# Patient Record
Sex: MALE | Race: WHITE | NOT HISPANIC OR LATINO | Employment: FULL TIME | URBAN - METROPOLITAN AREA
[De-identification: names, ages, dates, MRNs, and addresses within clinical notes are randomized per-mention and may not be internally consistent; named-entity substitution may affect disease eponyms.]

---

## 2017-06-10 ENCOUNTER — HOSPITAL ENCOUNTER (EMERGENCY)
Facility: OTHER | Age: 37
Discharge: HOME | End: 2017-06-10
Attending: EMERGENCY MEDICINE
Payer: COMMERCIAL

## 2017-06-10 VITALS
DIASTOLIC BLOOD PRESSURE: 78 MMHG | RESPIRATION RATE: 16 BRPM | HEIGHT: 71 IN | OXYGEN SATURATION: 99 % | SYSTOLIC BLOOD PRESSURE: 138 MMHG | HEART RATE: 87 BPM | BODY MASS INDEX: 23.1 KG/M2 | WEIGHT: 165 LBS | TEMPERATURE: 98 F

## 2017-06-10 DIAGNOSIS — S69.91XA INJURY OF NAIL BED OF FINGER OF RIGHT HAND, INITIAL ENCOUNTER: Primary | ICD-10-CM

## 2017-06-10 PROCEDURE — 99283 EMERGENCY DEPT VISIT LOW MDM: CPT

## 2017-06-10 PROCEDURE — 25000003 PHARM REV CODE 250: Performed by: EMERGENCY MEDICINE

## 2017-06-10 RX ADMIN — BACITRACIN, NEOMYCIN, POLYMYXIN B 1 EACH: 400; 3.5; 5 OINTMENT TOPICAL at 03:06

## 2017-06-10 NOTE — ED PROVIDER NOTES
"Encounter Date: 6/10/2017    SCRIBE #1 NOTE: I, Ryan Nan, am scribing for, and in the presence of, Dr. Ellis.       History     Chief Complaint   Patient presents with    Finger Pain     Patient was washing the dishes and was stuck under the nail of his right hand index finger with "a piece of pasta". Patient c/o pain to the finger and stated, "My family is freaking me out about it possibly being infected."     Review of patient's allergies indicates:   Allergen Reactions    Vancomycin analogues      Time seen by provider: 3:12 PM    This is a 36 y.o. male who presents to the ED with a chief complaint of mild pain to his right index finger. He states that he was scrubbing a pot about 5 hours ago when a piece of dried cooked moody hair pasta became stuck under his fingernail. He states that he does not believes there is any other foreign body under the nail. He reports he came in to the ED due to the urging of his family to ensure there is no infection in the finger. No significant pain, no bleeding, no other complaints          History reviewed. No pertinent past medical history.  Past Surgical History:   Procedure Laterality Date    FRACTURE SURGERY      nose     History reviewed. No pertinent family history.  Social History   Substance Use Topics    Smoking status: Current Every Day Smoker     Packs/day: 1.00     Types: Cigarettes    Smokeless tobacco: Not on file    Alcohol use 4.2 oz/week     7 Cans of beer per week     Review of Systems   Constitutional: Negative for fever.   HENT: Negative for sore throat.    Respiratory: Negative for shortness of breath.    Cardiovascular: Negative for chest pain.   Gastrointestinal: Negative for nausea.   Genitourinary: Negative for dysuria.   Musculoskeletal: Negative for back pain.        Positive for right index finger pain.   Skin: Negative for rash.   Neurological: Negative for weakness.   Hematological: Does not bruise/bleed easily.       Physical Exam "     Initial Vitals [06/10/17 1434]   BP Pulse Resp Temp SpO2   (!) 144/81 85 14 98.1 °F (36.7 °C) (!) 1 %     Physical Exam    Nursing note and vitals reviewed.  Constitutional: He appears well-developed and well-nourished. He is not diaphoretic. No distress.   HENT:   Head: Normocephalic and atraumatic.   Right Ear: External ear normal.   Left Ear: External ear normal.   Eyes: EOM are normal. Right eye exhibits no discharge. Left eye exhibits no discharge.   Neck: Normal range of motion.   Cardiovascular: Normal rate, regular rhythm and normal heart sounds. Exam reveals no gallop and no friction rub.    No murmur heard.  Pulmonary/Chest: Breath sounds normal. No respiratory distress. He has no wheezes. He has no rhonchi. He has no rales.   Abdominal: Soft. There is no tenderness. There is no rebound and no guarding.   Musculoskeletal: Normal range of motion. He exhibits no edema or tenderness.   Right index finger: Less than 1 cm area of blanching under the distal mid-fingernail. No subungual hematoma or obvious foreign body. Minimal tenderness. No erythema.   Neurological: He is alert and oriented to person, place, and time.   Skin: Skin is warm and dry. No rash and no abscess noted. No erythema. No pallor.   Psychiatric: He has a normal mood and affect. His behavior is normal. Judgment and thought content normal.         ED Course   Procedures  Labs Reviewed - No data to display          Medical Decision Making:   Initial Assessment:       Healthy 36M presents with R 2nd finger evaluation; he had cooked dry pasta get under nail, no bleeding or other known FB. Minimal pain and no sign infection on exam, which is unlikely since pt with no known break in skin and occurred only 5 hours ago. Slight blanching to fingernail where pasta is likely located; area irrigated to soften pasta and facilitate natural migration and evacuation of FB. No indication for nail removal to remove organic non-embedded FB, no indication  for imaging or empiric Abx. Discharged with conservative and expectant management, return precautions for any sign of unlikely infection                Scribe Attestation:   Scribe #1: I performed the above scribed service and the documentation accurately describes the services I performed. I attest to the accuracy of the note.    Attending Attestation:           Physician Attestation for Scribe:  Physician Attestation Statement for Scribe #1: I, Dr. Ellis, reviewed documentation, as scribed by Ryan Montana in my presence, and it is both accurate and complete.                 ED Course     Clinical Impression:     1. Injury of nail bed of finger of right hand, initial encounter                Eugenio Ellis MD  06/10/17 5515

## 2017-06-10 NOTE — ED TRIAGE NOTES
"Pt states, "I got an moody hair pasta stuck under my nail this morning."  Pain 1/10.  Yellow discoloration noted to distal nail bed of R forefinger.  "

## 2017-10-22 ENCOUNTER — HOSPITAL ENCOUNTER (EMERGENCY)
Facility: OTHER | Age: 37
Discharge: HOME | End: 2017-10-22
Attending: EMERGENCY MEDICINE
Payer: MEDICAID

## 2017-10-22 VITALS
OXYGEN SATURATION: 99 % | HEIGHT: 71 IN | HEART RATE: 64 BPM | TEMPERATURE: 98 F | WEIGHT: 170 LBS | SYSTOLIC BLOOD PRESSURE: 129 MMHG | RESPIRATION RATE: 18 BRPM | BODY MASS INDEX: 23.8 KG/M2 | DIASTOLIC BLOOD PRESSURE: 83 MMHG

## 2017-10-22 DIAGNOSIS — R00.2 PALPITATIONS: ICD-10-CM

## 2017-10-22 DIAGNOSIS — R07.9 CHEST PAIN OF UNCERTAIN ETIOLOGY: Primary | ICD-10-CM

## 2017-10-22 DIAGNOSIS — R07.9 CHEST PAIN: ICD-10-CM

## 2017-10-22 LAB
ALBUMIN SERPL BCP-MCNC: 4.5 G/DL
ALP SERPL-CCNC: 73 U/L
ALT SERPL W/O P-5'-P-CCNC: 41 U/L
AMPHET+METHAMPHET UR QL: NEGATIVE
ANION GAP SERPL CALC-SCNC: 10 MMOL/L
AST SERPL-CCNC: 25 U/L
BARBITURATES UR QL SCN>200 NG/ML: NEGATIVE
BASOPHILS # BLD AUTO: 0.03 K/UL
BASOPHILS NFR BLD: 0.3 %
BENZODIAZ UR QL SCN>200 NG/ML: NEGATIVE
BILIRUB SERPL-MCNC: 0.5 MG/DL
BILIRUB UR QL STRIP: NEGATIVE
BUN SERPL-MCNC: 14 MG/DL
BZE UR QL SCN: NEGATIVE
CALCIUM SERPL-MCNC: 9.9 MG/DL
CANNABINOIDS UR QL SCN: NEGATIVE
CHLORIDE SERPL-SCNC: 104 MMOL/L
CLARITY UR: CLEAR
CO2 SERPL-SCNC: 27 MMOL/L
COLOR UR: YELLOW
CREAT SERPL-MCNC: 0.9 MG/DL
CREAT UR-MCNC: 66.2 MG/DL
D DIMER PPP IA.FEU-MCNC: 0.31 MG/L FEU
DIFFERENTIAL METHOD: ABNORMAL
EOSINOPHIL # BLD AUTO: 0.2 K/UL
EOSINOPHIL NFR BLD: 2.7 %
ERYTHROCYTE [DISTWIDTH] IN BLOOD BY AUTOMATED COUNT: 13.4 %
EST. GFR  (AFRICAN AMERICAN): >60 ML/MIN/1.73 M^2
EST. GFR  (NON AFRICAN AMERICAN): >60 ML/MIN/1.73 M^2
GLUCOSE SERPL-MCNC: 94 MG/DL
GLUCOSE UR QL STRIP: NEGATIVE
HCT VFR BLD AUTO: 43.5 %
HGB BLD-MCNC: 15 G/DL
HGB UR QL STRIP: NEGATIVE
KETONES UR QL STRIP: NEGATIVE
LEUKOCYTE ESTERASE UR QL STRIP: NEGATIVE
LYMPHOCYTES # BLD AUTO: 2.5 K/UL
LYMPHOCYTES NFR BLD: 28.8 %
MAGNESIUM SERPL-MCNC: 2.2 MG/DL
MCH RBC QN AUTO: 31.7 PG
MCHC RBC AUTO-ENTMCNC: 34.5 G/DL
MCV RBC AUTO: 92 FL
METHADONE UR QL SCN>300 NG/ML: NEGATIVE
MONOCYTES # BLD AUTO: 0.6 K/UL
MONOCYTES NFR BLD: 7 %
NEUTROPHILS # BLD AUTO: 5.3 K/UL
NEUTROPHILS NFR BLD: 60.7 %
NITRITE UR QL STRIP: NEGATIVE
OPIATES UR QL SCN: NEGATIVE
PCP UR QL SCN>25 NG/ML: NEGATIVE
PH UR STRIP: 6 [PH] (ref 5–8)
PHOSPHATE SERPL-MCNC: 3.2 MG/DL
PLATELET # BLD AUTO: 236 K/UL
PMV BLD AUTO: 10.2 FL
POTASSIUM SERPL-SCNC: 4 MMOL/L
PROT SERPL-MCNC: 7.7 G/DL
PROT UR QL STRIP: NEGATIVE
RBC # BLD AUTO: 4.73 M/UL
SODIUM SERPL-SCNC: 141 MMOL/L
SP GR UR STRIP: 1.01 (ref 1–1.03)
TOXICOLOGY INFORMATION: NORMAL
TROPONIN I SERPL DL<=0.01 NG/ML-MCNC: <0.006 NG/ML
URN SPEC COLLECT METH UR: NORMAL
UROBILINOGEN UR STRIP-ACNC: NEGATIVE EU/DL
WBC # BLD AUTO: 8.66 K/UL

## 2017-10-22 PROCEDURE — 99284 EMERGENCY DEPT VISIT MOD MDM: CPT | Mod: 25

## 2017-10-22 PROCEDURE — 85025 COMPLETE CBC W/AUTO DIFF WBC: CPT

## 2017-10-22 PROCEDURE — 63600175 PHARM REV CODE 636 W HCPCS: Performed by: EMERGENCY MEDICINE

## 2017-10-22 PROCEDURE — 80307 DRUG TEST PRSMV CHEM ANLYZR: CPT

## 2017-10-22 PROCEDURE — 80053 COMPREHEN METABOLIC PANEL: CPT

## 2017-10-22 PROCEDURE — 96374 THER/PROPH/DIAG INJ IV PUSH: CPT

## 2017-10-22 PROCEDURE — 81003 URINALYSIS AUTO W/O SCOPE: CPT

## 2017-10-22 PROCEDURE — 84100 ASSAY OF PHOSPHORUS: CPT

## 2017-10-22 PROCEDURE — 96361 HYDRATE IV INFUSION ADD-ON: CPT

## 2017-10-22 PROCEDURE — 25000003 PHARM REV CODE 250: Performed by: EMERGENCY MEDICINE

## 2017-10-22 PROCEDURE — 93005 ELECTROCARDIOGRAM TRACING: CPT

## 2017-10-22 PROCEDURE — 93010 ELECTROCARDIOGRAM REPORT: CPT | Mod: ,,, | Performed by: INTERNAL MEDICINE

## 2017-10-22 PROCEDURE — 85379 FIBRIN DEGRADATION QUANT: CPT

## 2017-10-22 PROCEDURE — 83735 ASSAY OF MAGNESIUM: CPT

## 2017-10-22 PROCEDURE — 84484 ASSAY OF TROPONIN QUANT: CPT

## 2017-10-22 RX ORDER — KETOROLAC TROMETHAMINE 30 MG/ML
15 INJECTION, SOLUTION INTRAMUSCULAR; INTRAVENOUS
Status: COMPLETED | OUTPATIENT
Start: 2017-10-22 | End: 2017-10-22

## 2017-10-22 RX ADMIN — SODIUM CHLORIDE 1000 ML: 0.9 INJECTION, SOLUTION INTRAVENOUS at 02:10

## 2017-10-22 RX ADMIN — KETOROLAC TROMETHAMINE 15 MG: 30 INJECTION, SOLUTION INTRAMUSCULAR at 02:10

## 2017-10-22 NOTE — ED TRIAGE NOTES
"C/o left-sided chest pain described as "contraction" onset 30 minutes ago with palpitations. Pt was at rest at time of onset. Denies sob, n/v. Denies excessive caffeine intake. Reports pain decreased but still present. States "It feels like a muscle spasm."  "

## 2017-10-22 NOTE — ED NOTES
Patient sitting upright in recliner.  Eyes open, no apparent SOB or distress noted.  AAOx 4.  Family members at bedside.  Patient pleasant and cooperative with poc.  Patient updated with poc.  Call bell within reach, will continue to monitor.

## 2017-10-22 NOTE — ED PROVIDER NOTES
"Encounter Date: 10/22/2017    SCRIBE #1 NOTE: I, Vianca Murray, am scribing for, and in the presence of,  Dr. Burk. I have scribed the entire note.       History     Chief Complaint   Patient presents with    Chest Pain     Pt c/o left sided CP with mild SOB whic startede 30 minutes prior to arrival.     Time seen by provider: 2:01 PM    This is a 36 y.o. male who presents with complaint of palpitations which started about 30 minutes PTA. Pt was laying on the couch watching a game on TV when he had onset of palpitations. The palpitations "come with squeezing pain/tightness" to the L chest. It is unchanged with deep breathing or movement. He also c/o accompanying mild lightheadedness and tingling to the extremities. He states that he feels anxious at the moment. Pt denies any excessive drinking or any drug use. He did not eat breakfast but states that he typically does not have breakfast in the mornings. The only other thing he had done was go to a work meeting prior to watching the game. He denies any fever, chills, sore throat, rhinorrhea, SOB, cough, N/V/D, abdominal pain, leg swelling, HA, and weakness. He has no major medical problems and does not take any daily medications. Pt is not taking any unofficial or foreign supplementary medications. He smokes cigarettes and has about 12 drinks/week. Pt is allergic to vancomycin.      The history is provided by the patient.     Review of patient's allergies indicates:   Allergen Reactions    Vancomycin analogues      History reviewed. No pertinent past medical history.  Past Surgical History:   Procedure Laterality Date    FRACTURE SURGERY      nose     No family history on file.  Social History   Substance Use Topics    Smoking status: Current Every Day Smoker     Packs/day: 1.00     Types: Cigarettes    Smokeless tobacco: Never Used    Alcohol use 2.4 - 3.0 oz/week     4 - 5 Cans of beer per week      Comment: twice weekly     Review of Systems "   Constitutional: Negative for chills and fever.   HENT: Negative for rhinorrhea and sore throat.    Eyes: Negative for visual disturbance.   Respiratory: Positive for chest tightness (L). Negative for cough and shortness of breath.    Cardiovascular: Positive for chest pain (L) and palpitations. Negative for leg swelling.   Gastrointestinal: Negative for abdominal pain, diarrhea, nausea and vomiting.   Genitourinary: Negative for dysuria and frequency.   Musculoskeletal: Negative for joint swelling, neck pain and neck stiffness.   Skin: Negative for rash.   Neurological: Positive for light-headedness (mild). Negative for weakness, numbness and headaches.        Tingling to all four extremities.   Psychiatric/Behavioral: Negative for confusion. The patient is nervous/anxious.        Physical Exam     Initial Vitals [10/22/17 1329]   BP Pulse Resp Temp SpO2   137/77 108 18 98.2 °F (36.8 °C) 98 %      MAP       97         Vitals:    10/22/17 1512 10/22/17 1539 10/22/17 1608 10/22/17 1638   BP:  125/77 127/74 124/80   Pulse: 64 70 64 70   Resp:       Temp:       TempSrc:       SpO2: 99% 99% 98% 99%   Weight:       Height:        10/22/17 1648   BP: 129/83   Pulse: 64   Resp:    Temp:    TempSrc:    SpO2: 99%   Weight:    Height:        Physical Exam    Nursing note and vitals reviewed.  Constitutional: He appears well-developed and well-nourished. No distress.   HENT:   Head: Normocephalic and atraumatic.   Mouth/Throat: Oropharynx is clear and moist.   Eyes: Conjunctivae and EOM are normal. Pupils are equal, round, and reactive to light.   Neck: Normal range of motion. Neck supple.   Cardiovascular: Normal rate, regular rhythm and normal heart sounds.   No murmur heard.  Pulses:       Radial pulses are 2+ on the right side, and 2+ on the left side.   Pulmonary/Chest: Breath sounds normal. No respiratory distress. He has no wheezes. He has no rhonchi. He has no rales. He exhibits tenderness.   TTP to L anterior chest  "wall.   Abdominal: Soft. Bowel sounds are normal. There is no tenderness.   Musculoskeletal: Normal range of motion.   No calf tenderness or edema.   Neurological: He is alert and oriented to person, place, and time. He has normal strength. No cranial nerve deficit or sensory deficit.   Skin: Skin is warm and dry. No rash noted.   Psychiatric: He has a normal mood and affect. His behavior is normal.         ED Course   Procedures  Labs Reviewed   CBC W/ AUTO DIFFERENTIAL - Abnormal; Notable for the following:        Result Value    MCH 31.7 (*)     All other components within normal limits   COMPREHENSIVE METABOLIC PANEL   URINALYSIS   DRUG SCREEN PANEL, URINE EMERGENCY   TROPONIN I   D DIMER, QUANTITATIVE   MAGNESIUM   PHOSPHORUS     EKG Readings: (Independently Interpreted)   ST at a rate of 111. No STEMI. S1Q3T3 pattern.        X-Rays:   Independently Interpreted Readings:   Chest X-Ray: No infiltrate, effusion, or PTX. No acute process. Will refer to official radiology reading.     Imaging Results          X-Ray Chest PA And Lateral (Final result)  Result time 10/22/17 14:33:45    Final result by Valencia Kothari MD (10/22/17 14:33:45)                 Impression:     No detrimental change since 8/22/10.      Electronically signed by: VALENCIA KOTHARI MD  Date:     10/22/17  Time:    14:33              Narrative:    XR CHEST PA AND LATERAL.  Clinical History provided for exam:"Chest Pain". The cardiac silhouette is not enlarged.  Pulmonary vascularity is not increased.  The lungs are free of lobar consolidation and alveolar edema.  There is no pleural effusion or pneumothorax.  Visualized osseous structures are intact.                              Medical Decision Making:   Independently Interpreted Test(s):   I have ordered and independently interpreted X-rays - see prior notes.  I have ordered and independently interpreted EKG Reading(s) - see prior notes  Clinical Tests:   Lab Tests: Ordered and " Reviewed  Radiological Study: Ordered and Reviewed  Medical Tests: Ordered and Reviewed  ED Management:  Emergent evaluation of 36-year-old male with complaint of sudden onset palpitations and chest pain while at rest.  Vital signs reveal mild tachycardia, afebrile.  Physical exam revealed reproducible tenderness over the left chest wall.  EKG showed no acute ischemic change or dysrhythmia, tachycardia only.  He was treated with fluids and Toradol with resolution in symptoms.  I considered possibility of pulmonary embolism, d-dimer is negative and he is low risk.  Labs reveal no electrolyte disturbance, profound anemia, renal insufficiency, or other major abnormality.  Chest x-ray shows no cause for pain.  I'm comfortable with discharge home, he is advised to follow-up with his PCP for 24-hour Holter monitor or return for any new or worsening symptoms.            Scribe Attestation:   Scribe #1: I performed the above scribed service and the documentation accurately describes the services I performed. I attest to the accuracy of the note.    I, Dr. Radha Burk, personally performed the services described in this documentation. All medical record entries made by the scribe were at my direction and in my presence.  I have reviewed the chart and agree that the record reflects my personal performance and is accurate and complete. Radha Burk MD.  8:11 PM 10/25/2017          ED Course      Clinical Impression:     1. Chest pain of uncertain etiology    2. Chest pain    3. Palpitations                                 Radha Burk MD  10/25/17 2012

## 2017-11-02 ENCOUNTER — HOSPITAL ENCOUNTER (EMERGENCY)
Facility: OTHER | Age: 37
Discharge: HOME | End: 2017-11-02
Attending: EMERGENCY MEDICINE
Payer: MEDICAID

## 2017-11-02 VITALS
HEIGHT: 71 IN | RESPIRATION RATE: 18 BRPM | DIASTOLIC BLOOD PRESSURE: 63 MMHG | WEIGHT: 175 LBS | SYSTOLIC BLOOD PRESSURE: 117 MMHG | OXYGEN SATURATION: 98 % | HEART RATE: 87 BPM | TEMPERATURE: 97 F | BODY MASS INDEX: 24.5 KG/M2

## 2017-11-02 DIAGNOSIS — R07.9 CHEST PAIN: ICD-10-CM

## 2017-11-02 DIAGNOSIS — R07.9 CHEST PAIN, UNSPECIFIED TYPE: Primary | ICD-10-CM

## 2017-11-02 PROCEDURE — 25000003 PHARM REV CODE 250: Performed by: PHYSICIAN ASSISTANT

## 2017-11-02 PROCEDURE — 93010 ELECTROCARDIOGRAM REPORT: CPT | Mod: ,,, | Performed by: INTERNAL MEDICINE

## 2017-11-02 PROCEDURE — 93005 ELECTROCARDIOGRAM TRACING: CPT

## 2017-11-02 PROCEDURE — 63600175 PHARM REV CODE 636 W HCPCS: Performed by: PHYSICIAN ASSISTANT

## 2017-11-02 PROCEDURE — 96372 THER/PROPH/DIAG INJ SC/IM: CPT

## 2017-11-02 PROCEDURE — 99283 EMERGENCY DEPT VISIT LOW MDM: CPT | Mod: 25

## 2017-11-02 RX ORDER — METHOCARBAMOL 500 MG/1
1000 TABLET, FILM COATED ORAL 3 TIMES DAILY
Qty: 30 TABLET | Refills: 0 | Status: SHIPPED | OUTPATIENT
Start: 2017-11-02 | End: 2017-11-07

## 2017-11-02 RX ORDER — KETOROLAC TROMETHAMINE 30 MG/ML
15 INJECTION, SOLUTION INTRAMUSCULAR; INTRAVENOUS
Status: COMPLETED | OUTPATIENT
Start: 2017-11-02 | End: 2017-11-02

## 2017-11-02 RX ORDER — METHOCARBAMOL 500 MG/1
1000 TABLET, FILM COATED ORAL
Status: COMPLETED | OUTPATIENT
Start: 2017-11-02 | End: 2017-11-02

## 2017-11-02 RX ADMIN — METHOCARBAMOL 1000 MG: 500 TABLET ORAL at 10:11

## 2017-11-02 RX ADMIN — KETOROLAC TROMETHAMINE 15 MG: 30 INJECTION, SOLUTION INTRAMUSCULAR at 10:11

## 2017-11-02 NOTE — ED NOTES
Patient Identifiers for Raymundo Abebe checked and correct  LOC: The patient is awake, alert and aware of environment with an appropriate affect, the patient is oriented x 3 and speaking appropriate.  APPEARANCE: Patient resting comfortably and in no acute distress. The patient is clean and well groomed. The patient's clothing is properly fastened.  SKIN: The skin is warm and dry. The patient has normal skin turgor and moist mucus membranes. No rashes or lesions upon observation. Skin Intact , no breakdown noted.  Musculoskeletal :  Normal range of motion noted. Moves all extremities well, No swelling or deformities. Left subclavicular pain reproduced upon palpation/ Pain slight reproduced upon palpation of the left ribs.  RESPIRATORY: Airway is open and patent, respirations are spontaneous, patient has a normal effort and rate. Breath sounds are clear & equal, bilaterally.  CARDIAC: Patient has a normal rate and rhythm, no peripheral edema noted, capillary refill < 3 seconds.   PULSES: 2+ radial pulses, symmetrical.  Will continue to monitor

## 2017-11-02 NOTE — ED PROVIDER NOTES
"Encounter Date: 11/2/2017       History     Chief Complaint   Patient presents with    Chest Pain     Pt c/o left sided sub clavicular pain & left sided lateral rib pain. Pt reports that he was evaluated 2 weeks ago for pain in the same region. Pt reports that the pain had subsided, but exacerbated this morning. Denies SOB, N/V.     Patient is a 36-year-old male with no significant past medical history who presents to the ED with chest pain.  He states he was evaluated here 10 days ago for similar symptoms.  He states this pain subsided with interventions provided here in the ED.  He reports laying down this morning on the couch and having left-sided chest wall pain that he describes as a "muscle spasm".  He also reports intermittent "tingling" to all his extremities.  He denies radiation of pain, diaphoresis, nausea, or vomiting.  He denies heart racing or palpitations.  He denies any relieving or exacerbating factors.  He denies shortness of breath or cough.  He denies fever or recent illness.  He denies leg pain or swelling.  He denies feeling anxious.  He states he did not follow up with PCP as advised.      The history is provided by the patient.     Review of patient's allergies indicates:   Allergen Reactions    Vancomycin analogues      History reviewed. No pertinent past medical history.  Past Surgical History:   Procedure Laterality Date    FRACTURE SURGERY      nose     History reviewed. No pertinent family history.  Social History   Substance Use Topics    Smoking status: Current Every Day Smoker     Packs/day: 1.00     Types: Cigarettes    Smokeless tobacco: Never Used    Alcohol use 2.4 - 3.0 oz/week     4 - 5 Cans of beer per week      Comment: twice weekly     Review of Systems   Constitutional: Negative for chills and fever.   HENT: Negative for congestion and sore throat.    Eyes: Negative for visual disturbance.   Respiratory: Negative for cough and shortness of breath.    Cardiovascular: " Positive for chest pain. Negative for palpitations and leg swelling.   Gastrointestinal: Negative for abdominal pain, diarrhea, nausea and vomiting.   Genitourinary: Negative for dysuria.   Musculoskeletal: Negative for arthralgias.   Skin: Negative for rash.   Neurological: Negative for headaches.        Tingling to BUE and BLE       Physical Exam     Initial Vitals [11/02/17 1011]   BP Pulse Resp Temp SpO2   (!) 142/82 73 18 97.4 °F (36.3 °C) 98 %      MAP       102         Physical Exam    Constitutional: He is cooperative.   White male in no acute distress.  She speaks in clear and full sentences.  He is nontoxic appearing.   HENT:   Head: Normocephalic and atraumatic.   Mouth/Throat: Oropharynx is clear and moist.   Eyes: Conjunctivae and EOM are normal. Pupils are equal, round, and reactive to light.   Neck: Normal range of motion. Neck supple.   Cardiovascular: Normal rate, regular rhythm and intact distal pulses.   Pulmonary/Chest: Breath sounds normal. He has no wheezes. He has no rhonchi. He has no rales.   Pain with palpation to left-sided chest wall.   Abdominal: Soft. Bowel sounds are normal. There is no tenderness.   Musculoskeletal: Normal range of motion.   No edema to BLE   Neurological: GCS eye subscore is 4. GCS verbal subscore is 5. GCS motor subscore is 6.   AAOx4. CN II-XII were intact. Good finger-to-nose task ability. Shayna intact. Strength 5/5 in all extremities. Normal sensation to light touch.  Normal gait.   Skin: Skin is warm and dry. Capillary refill takes less than 2 seconds. No rash noted.   Psychiatric: He has a normal mood and affect. His behavior is normal.         ED Course   Procedures  Labs Reviewed - No data to display  EKG Readings: (Independently Interpreted)   Normal sinus rhythm at a rate of 74 bpm.  Normal intervals.  Normal QRS.  No acute ST or T-wave changes.  No S1Q3T3.  No significant changes from previous EKG.          Medical Decision Making:   Initial Assessment:    Urgent evaluation of a 36 y.o. male presenting to the emergency department complaining of chest pain. Patient is afebrile, nontoxic appearing and hemodynamically stable.  ED Management:  Patient with left-sided chest pain that is reproducible on my exam.  He is reporting pain has improved in the past 30 minutes.  EKG is negative for ischemic changes.  Vital signs are stable.  He is not tachycardic or hypoxic.  Perc criteria is 0.  I do not suspect PE or ACS.  Upon reviewing medical records from his ED visit on 10/22, chest x-ray was negative and d-dimer was negative.  He is not reporting palpitations today.  I will give him Toradol and Robaxin here in the ED.  I will give him referral to PCPs and I have advised him that a Holter monitor is the next step.  I will send him home with Robaxin.  I have given him specific return precautions.  He is amendable to this plan. I have discussed the patient with the attending thoroughly and he/she agrees to the treatment and plan.   Other:   I have discussed this case with another health care provider.  This note was created using Dragon Medical Dictation. There may be typographical errors secondary to dictation.     Additional MDM:   PERC Rule:   Age is greater than or equal to 50 = 0.0  Heart Rate is greater than or equal to 100 = 0.0  SaO2 on room air < 95% = 0.0  Unilateral leg swelling = 0.0  Hemoptysis = 0.0  Recent surgery or trauma = 0.0  Prior PE or DVT =  0.0  Hormone use = 0.00  PERC Score = 0                ED Course      Clinical Impression:     1. Chest pain, unspecified type    2. Chest pain                             Gomez Sheehan PA-C  11/02/17 0377

## 2017-11-02 NOTE — ED TRIAGE NOTES
"Pt c/o left sided subclavicluar & left lateral rib pain X 2 weeks. Pt reports pain had subsided, but states "It hard me hard this morning." Pt describes pain as "pulling" & "tight."  "

## 2018-01-04 ENCOUNTER — HOSPITAL ENCOUNTER (EMERGENCY)
Facility: OTHER | Age: 38
Discharge: HOME | End: 2018-01-04
Attending: EMERGENCY MEDICINE
Payer: MEDICAID

## 2018-01-04 VITALS
DIASTOLIC BLOOD PRESSURE: 66 MMHG | WEIGHT: 165 LBS | HEIGHT: 71 IN | SYSTOLIC BLOOD PRESSURE: 123 MMHG | RESPIRATION RATE: 18 BRPM | HEART RATE: 77 BPM | BODY MASS INDEX: 23.1 KG/M2 | TEMPERATURE: 98 F | OXYGEN SATURATION: 98 %

## 2018-01-04 DIAGNOSIS — R07.9 CHEST PAIN, UNSPECIFIED TYPE: Primary | ICD-10-CM

## 2018-01-04 DIAGNOSIS — R07.9 CHEST PAIN: ICD-10-CM

## 2018-01-04 PROCEDURE — 63600175 PHARM REV CODE 636 W HCPCS: Performed by: PHYSICIAN ASSISTANT

## 2018-01-04 PROCEDURE — 93010 ELECTROCARDIOGRAM REPORT: CPT | Mod: ,,, | Performed by: INTERNAL MEDICINE

## 2018-01-04 PROCEDURE — 99283 EMERGENCY DEPT VISIT LOW MDM: CPT | Mod: 25

## 2018-01-04 PROCEDURE — 96372 THER/PROPH/DIAG INJ SC/IM: CPT

## 2018-01-04 PROCEDURE — 93005 ELECTROCARDIOGRAM TRACING: CPT

## 2018-01-04 RX ORDER — ORPHENADRINE CITRATE 30 MG/ML
30 INJECTION INTRAMUSCULAR; INTRAVENOUS
Status: COMPLETED | OUTPATIENT
Start: 2018-01-04 | End: 2018-01-04

## 2018-01-04 RX ORDER — KETOROLAC TROMETHAMINE 30 MG/ML
30 INJECTION, SOLUTION INTRAMUSCULAR; INTRAVENOUS
Status: COMPLETED | OUTPATIENT
Start: 2018-01-04 | End: 2018-01-04

## 2018-01-04 RX ADMIN — KETOROLAC TROMETHAMINE 30 MG: 30 INJECTION, SOLUTION INTRAMUSCULAR at 06:01

## 2018-01-04 RX ADMIN — ORPHENADRINE CITRATE 30 MG: 30 INJECTION INTRAMUSCULAR; INTRAVENOUS at 06:01

## 2018-01-05 NOTE — ED TRIAGE NOTES
Pt c/o chest pain/tightness under left breast for 2-3 hours PTA - states this has happened in the past and has been seen here twice before for the same thing - pt states feeling palpitations at times

## 2018-01-05 NOTE — ED PROVIDER NOTES
Encounter Date: 1/4/2018       History     Chief Complaint   Patient presents with    Chest Pain     chest pain x 2-3 hrs. reports prior episodes of chest pain that are similiar. denies any SOB or nausea/vomiting     Patient is a 37 y.o. male presenting to the emergency department with complaints of chest pain.  The patient reports that earlier today at approximately 4 PM.  He had left-sided chest pain.  He states it radiated into his shoulder.  He denies associated shortness of breath, nausea, vomiting, diaphoresis. He states that at maximum, the pain was a 5/10.  He reports he last took her approximately one hour and has started to subside, stating his pain is now at 3/10.  He denies numbness, tingling, weakness of his bilateral upper or lower extremities.  He admits he has had multiple similar episodes in the past, stating that this was less severe than previously.  He denies following up with his primary care provider or cardiology.  He admits he smokes cigarettes, denies a history of hypertension, diabetes, early family cardiac history.  He has not taken any medication for his symptoms thus far.      The history is provided by the patient.     Review of patient's allergies indicates:   Allergen Reactions    Vancomycin analogues      History reviewed. No pertinent past medical history.  Past Surgical History:   Procedure Laterality Date    FRACTURE SURGERY      nose     History reviewed. No pertinent family history.  Social History   Substance Use Topics    Smoking status: Current Every Day Smoker     Packs/day: 1.00     Types: Cigarettes    Smokeless tobacco: Never Used    Alcohol use 2.4 - 3.0 oz/week     4 - 5 Cans of beer per week      Comment: twice weekly     Review of Systems   Constitutional: Negative for activity change, chills, fatigue and fever.   HENT: Negative for congestion, rhinorrhea and sore throat.    Eyes: Negative for photophobia and visual disturbance.   Respiratory: Negative for cough  and shortness of breath.    Cardiovascular: Positive for chest pain.   Gastrointestinal: Negative for abdominal pain, diarrhea, nausea and vomiting.   Genitourinary: Negative for dysuria, hematuria and urgency.   Musculoskeletal: Negative for back pain, myalgias and neck pain.   Skin: Negative for color change and wound.   Neurological: Negative for weakness and headaches.   Psychiatric/Behavioral: Negative for agitation and confusion.       Physical Exam     Initial Vitals [01/04/18 1700]   BP Pulse Resp Temp SpO2   (!) 141/82 80 18 98.3 °F (36.8 °C) 97 %      MAP       101.67         Physical Exam    Nursing note and vitals reviewed.  Constitutional: Vital signs are normal. He appears well-developed and well-nourished. He is not diaphoretic.  Non-toxic appearance. He does not have a sickly appearance. He does not appear ill. No distress.   Well appearing,  male, unaccompanied in the ED. Speaking in clear and full sentences, moving all extremities. No acute distress.    HENT:   Head: Normocephalic and atraumatic.   Right Ear: External ear normal.   Left Ear: External ear normal.   Nose: Nose normal.   Mouth/Throat: Oropharynx is clear and moist.   Eyes: Conjunctivae and EOM are normal.   Neck: Normal range of motion. Neck supple.   Cardiovascular: Normal rate, regular rhythm and normal heart sounds.   Pulmonary/Chest: Breath sounds normal. No respiratory distress. He has no wheezes.       Reproducible tenderness to palpation of the left anterior chest wall with no palpable deformity, crepitus, step-off.  No skin changes.   Musculoskeletal: Normal range of motion.   Normal range of motion, strength, sensation of the bilateral upper extremities.  Normal range of motion, strength, sensation bilaterally lower extremities.  Ambulatory and weightbearing with a steady gait.   Neurological: He is alert and oriented to person, place, and time. He has normal strength. No sensory deficit. GCS eye subscore is 4. GCS  verbal subscore is 5. GCS motor subscore is 6.   Skin: Skin is warm.   Psychiatric: He has a normal mood and affect. His behavior is normal. Judgment and thought content normal.         ED Course   Procedures  Labs Reviewed - No data to display  EKG Readings: (Independently Interpreted)   Initial Reading: No STEMI. Previous EKG: Compared with most recent EKG Previous EKG Date: 11/2/17. Rhythm: Normal Sinus Rhythm. Heart Rate: 72.     Imaging Results          X-Ray Chest PA And Lateral (Final result)  Result time 01/04/18 17:53:50    Final result by Yumiko Villa MD (01/04/18 17:53:50)                 Impression:      No acute cardiopulmonary process identified.      Electronically signed by: YUMIKO VILLA MD  Date:     01/04/18  Time:    17:53              Narrative:    Chest PA and lateral.  Comparison: 10/22/17.    Cardiac silhouette is normal in size.  Mediastinal structures are midline.  Lungs are symmetrically expanded.  No evidence of focal consolidative process, pneumothorax, or significant effusion.  Bones appear intact.  No free air visualized beneath the diaphragm.                             X-Rays:   Independently Interpreted Readings:   Chest X-Ray: Normal heart size.  No infiltrates.  No acute abnormalities.     Medical Decision Making:   History:   Old Medical Records: I decided to obtain old medical records.  Old Records Summarized: other records.       <> Summary of Records: Reviewed medical record in [facility], similar in ED visit in 10/2017 and 11/2017.  Negative workup.  Initial Assessment:   Urgent evaluation of a 37-year-old male presenting to the emergency department with complaints of chest pain.  Patient is afebrile, nontoxic appearing, hemodynamically stable.  Physical exam reveals mildly reproducible tenderness to palpation of the anterior chest wall, lungs are clear to auscultation bilaterally.  Regular rate and rhythm.  Will plan for EKG and chest x-ray.  Independently Interpreted  Test(s):   I have ordered and independently interpreted X-rays - see prior notes.  I have ordered and independently interpreted EKG Reading(s) - see prior notes  Clinical Tests:   Radiological Study: Ordered and Reviewed  Medical Tests: Ordered and Reviewed  ED Management:  EKG shows normal sinus rhythm with a rate of 70 bpm, no STEMI.  Chest x-ray shows no acute findings.  Patient does not have any significant risk factors for ACS, do not feel this is consistent with ACS at this time.  Signs and symptoms could be secondary to musculoskeletal etiology.  Will plan to administer Toradol and Norflex.  We'll discharge home with symptomatic care instructions, recommending follow-up with cardiology.  Stable for discharge. The patient was instructed to follow up with a primary care provider in 2 days or to return to the emergency department for worsening symptoms. The treatment plan was discussed with the patient who demonstrated understanding and comfort with plan. The patient's history, physical exam, and plan of care was discussed with and agreed upon with my supervising physician.    Other:   I have discussed this case with another health care provider.       <> Summary of the Discussion: Dr. Ellis  This note was created using Dragon Medical Dictation. There may be typographical errors secondary to dictation.                    ED Course      Clinical Impression:     1. Chest pain, unspecified type    2. Chest pain       Disposition:   Disposition: Discharged  Condition: Stable                        Vivien Cano PA-C  01/04/18 [id number]

## 2018-01-09 ENCOUNTER — HOSPITAL ENCOUNTER (EMERGENCY)
Facility: OTHER | Age: 38
Discharge: HOME | End: 2018-01-09
Attending: EMERGENCY MEDICINE
Payer: MEDICAID

## 2018-01-09 VITALS
BODY MASS INDEX: 26.32 KG/M2 | HEART RATE: 78 BPM | RESPIRATION RATE: 16 BRPM | DIASTOLIC BLOOD PRESSURE: 89 MMHG | TEMPERATURE: 98 F | WEIGHT: 188 LBS | HEIGHT: 71 IN | SYSTOLIC BLOOD PRESSURE: 132 MMHG | OXYGEN SATURATION: 99 %

## 2018-01-09 DIAGNOSIS — R07.9 CHEST PAIN: Primary | ICD-10-CM

## 2018-01-09 DIAGNOSIS — L40.9 PSORIASIS: ICD-10-CM

## 2018-01-09 LAB
ALBUMIN SERPL BCP-MCNC: 4.4 G/DL
ALP SERPL-CCNC: 71 U/L
ALT SERPL W/O P-5'-P-CCNC: 44 U/L
ANION GAP SERPL CALC-SCNC: 9 MMOL/L
AST SERPL-CCNC: 25 U/L
BASOPHILS # BLD AUTO: 0.03 K/UL
BASOPHILS NFR BLD: 0.4 %
BILIRUB SERPL-MCNC: 0.4 MG/DL
BUN SERPL-MCNC: 9 MG/DL
CALCIUM SERPL-MCNC: 9.7 MG/DL
CHLORIDE SERPL-SCNC: 106 MMOL/L
CO2 SERPL-SCNC: 25 MMOL/L
CREAT SERPL-MCNC: 0.9 MG/DL
DIFFERENTIAL METHOD: ABNORMAL
EOSINOPHIL # BLD AUTO: 0.2 K/UL
EOSINOPHIL NFR BLD: 2.6 %
ERYTHROCYTE [DISTWIDTH] IN BLOOD BY AUTOMATED COUNT: 13.2 %
EST. GFR  (AFRICAN AMERICAN): >60 ML/MIN/1.73 M^2
EST. GFR  (NON AFRICAN AMERICAN): >60 ML/MIN/1.73 M^2
GLUCOSE SERPL-MCNC: 87 MG/DL
HCT VFR BLD AUTO: 44.4 %
HGB BLD-MCNC: 15.2 G/DL
LYMPHOCYTES # BLD AUTO: 2.4 K/UL
LYMPHOCYTES NFR BLD: 34.4 %
MCH RBC QN AUTO: 31.3 PG
MCHC RBC AUTO-ENTMCNC: 34.2 G/DL
MCV RBC AUTO: 91 FL
MONOCYTES # BLD AUTO: 0.7 K/UL
MONOCYTES NFR BLD: 9.7 %
NEUTROPHILS # BLD AUTO: 3.6 K/UL
NEUTROPHILS NFR BLD: 52.8 %
PLATELET # BLD AUTO: 213 K/UL
PMV BLD AUTO: 10.3 FL
POTASSIUM SERPL-SCNC: 4 MMOL/L
PROT SERPL-MCNC: 7.7 G/DL
RBC # BLD AUTO: 4.86 M/UL
SODIUM SERPL-SCNC: 140 MMOL/L
TROPONIN I SERPL DL<=0.01 NG/ML-MCNC: <0.006 NG/ML
WBC # BLD AUTO: 6.88 K/UL

## 2018-01-09 PROCEDURE — 93010 ELECTROCARDIOGRAM REPORT: CPT | Mod: ,,, | Performed by: INTERNAL MEDICINE

## 2018-01-09 PROCEDURE — 99284 EMERGENCY DEPT VISIT MOD MDM: CPT

## 2018-01-09 PROCEDURE — 80053 COMPREHEN METABOLIC PANEL: CPT

## 2018-01-09 PROCEDURE — 85025 COMPLETE CBC W/AUTO DIFF WBC: CPT

## 2018-01-09 PROCEDURE — 84484 ASSAY OF TROPONIN QUANT: CPT

## 2018-01-09 RX ORDER — HYDROCORTISONE 1 %
CREAM (GRAM) TOPICAL
Qty: 30 G | Refills: 1 | Status: SHIPPED | OUTPATIENT
Start: 2018-01-09 | End: 2019-09-17 | Stop reason: ALTCHOICE

## 2018-01-09 RX ORDER — IBUPROFEN 600 MG/1
600 TABLET ORAL EVERY 6 HOURS PRN
Qty: 20 TABLET | Refills: 0 | Status: SHIPPED | OUTPATIENT
Start: 2018-01-09 | End: 2019-09-17 | Stop reason: ALTCHOICE

## 2018-01-09 RX ORDER — METHOCARBAMOL 500 MG/1
1000 TABLET, FILM COATED ORAL 3 TIMES DAILY PRN
Qty: 30 TABLET | Refills: 0 | Status: SHIPPED | OUTPATIENT
Start: 2018-01-09 | End: 2018-01-14

## 2018-01-09 NOTE — ED TRIAGE NOTES
Pt states has been here a couple times for the same thing.  States today got lightheaded, dizzy, chest started tightening up.  States he feels like when he's on his feet he starts feeling this way.  Has an appointment with a cardiologist on Monday and an ultrasound tomorrow.  Had a little nausea.  Denies cough or cold symptoms, fever or chills.

## 2018-01-09 NOTE — ED NOTES
Patient Identifiers for Raymundo Abebe checked and correct  LOC: The patient is awake, alert and aware of environment with an appropriate affect, the patient is oriented x 3 and speaking appropriate.  APPEARANCE: Patient resting comfortably and in no acute distress. The patient is clean and well groomed. The patient's clothing is properly fastened.  SKIN: The skin is warm and dry. The patient has normal skin turgor and moist mucus membranes. No rashes or lesions upon observation. Skin Intact , no breakdown noted.  Musculoskeletal :  Normal range of motion noted. Moves all extremities well, No swelling or tenderness noted  RESPIRATORY: Airway is open and patent, respirations are spontaneous, patient has a normal effort and rate.   CARDIAC: Patient has a normal rate , no peripheral edema noted, capillary refill < 3 seconds. Pt reports chest tightness  PULSES: 2+ radial  pulses, symmetrical in all extremities.  NEUROLOGIC: PERRL, . Motor strength 5/5 all extremities.  The pt's facial expression is symmetrical, patient moving all extremities, normal sensation in all extremities when touched with a finger.The patient is awake, alert and cooperative with a calm affect, patient is aware of environment.  Pt reports dizziness and lightheadedness    Will continue to monitor

## 2018-01-10 NOTE — ED PROVIDER NOTES
"Encounter Date: 1/9/2018    SCRIBE #1 NOTE: I, Edna Myers, am scribing for, and in the presence of, Dr. Burk.       History     Chief Complaint   Patient presents with    Chest Pain     L sided x1 hour w/ dizziness and SOB. pt also has rash to bilat palms and bilat soles of feet x2 years     Time seen by provider: 6:25 PM    This is a 37 y.o. male who presents with complaint of chest chain which started earlier today. 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 Chest pain is described as intermittent "chest tightness" and is located on the left side of the chest with some radiation to the left shoulder and ribs. It is exacerbated with certain movements and palpation. He reports associated dizziness and light-headedness which he states started before onset of chest pain. Patient says that symptoms are consistent with previous episodes for which negative workup has been performed. He has not tried any medications to alleviate pain and has an appointment with cardiology scheduled for tomorrow. Patient denies fever, chills, nausea, vomiting, cough, sore throat, shortness of breath, palpitations, leg swelling, diaphoresis, abdominal pain, diarrhea, constipation, or dysuria. He denies a family history of early MI and denies having a personal history of hypertension or diabetes or any medical problems. The patient also denies use of illicit drugs.     In review of systems, patient reports unrelated rash to the bilateral hands and palms which is described as "dry patches." Rash has been present for the past couple of years and patient has been prescribed topical creams by dermatology. He has no additional complaints.      The history is provided by the patient.     Review of patient's allergies indicates:   Allergen Reactions    Vancomycin analogues      History reviewed. No pertinent past medical history.  Past Surgical History:   Procedure Laterality Date    FRACTURE SURGERY      nose     History reviewed. " No pertinent family history.  Social History   Substance Use Topics    Smoking status: Current Every Day Smoker     Packs/day: 1.00     Types: Cigarettes    Smokeless tobacco: Never Used    Alcohol use 2.4 - 3.0 oz/week     4 - 5 Cans of beer per week      Comment: twice weekly     Review of Systems   Constitutional: Negative for chills, diaphoresis and fever.   HENT: Negative for sore throat.    Respiratory: Positive for chest tightness. Negative for cough and shortness of breath.    Cardiovascular: Positive for chest pain. Negative for palpitations and leg swelling.   Gastrointestinal: Negative for abdominal pain, constipation, diarrhea, nausea and vomiting.   Genitourinary: Negative for dysuria.   Skin: Positive for rash (bilateral hands and feet).   Neurological: Positive for dizziness and light-headedness.   Hematological: Does not bruise/bleed easily.       Physical Exam     Initial Vitals [01/09/18 1611]   BP Pulse Resp Temp SpO2   (!) 148/82 72 18 98.2 °F (36.8 °C) 99 %      MAP       104         Physical Exam    Nursing note and vitals reviewed.  Constitutional: He appears well-developed and well-nourished. He is not diaphoretic. No distress.   HENT:   Head: Normocephalic and atraumatic.   Mouth/Throat: Oropharynx is clear and moist. No oropharyngeal exudate.   Eyes: EOM are normal. Pupils are equal, round, and reactive to light. No scleral icterus.   Neck: Normal range of motion. Neck supple.   Cardiovascular: Normal rate, regular rhythm and normal heart sounds. Exam reveals no gallop and no friction rub.    No murmur heard.  Pulmonary/Chest: Breath sounds normal. No respiratory distress. He has no wheezes. He has no rhonchi. He has no rales.   Abdominal: Soft. Bowel sounds are normal. He exhibits no distension. There is no tenderness. There is no rebound and no guarding.   Musculoskeletal: Normal range of motion. He exhibits no edema or tenderness.   Neurological: He is alert and oriented to person,  place, and time.   Skin: Skin is warm and dry. Capillary refill takes less than 2 seconds. Rash noted. No abscess noted. There is erythema. No pallor.   Faint erythema with some scaly patches and cracks located the palms and soles.  On the feet, some of these plaques have silvery scale. No interdigital burrows.   Psychiatric: He has a normal mood and affect. His behavior is normal. Judgment and thought content normal.         ED Course   Procedures  Labs Reviewed   CBC W/ AUTO DIFFERENTIAL - Abnormal; Notable for the following:        Result Value    MCH 31.3 (*)     All other components within normal limits   COMPREHENSIVE METABOLIC PANEL   TROPONIN I     Imaging Results          X-Ray Chest PA And Lateral (Final result)  Result time 01/09/18 16:48:20    Final result by Alfa Gan MD (01/09/18 16:48:20)                 Impression:        No evidence of acute intrathoracic disease.      Electronically signed by: ALFA GAN MD  Date:     01/09/18  Time:    16:48              Narrative:    Technique:  Chest PA and lateral radiographs    Comparison: 1/4/18    Findings:     Lung volumes are normal and symmetric. No pleural fluid or pneumothorax. The mediastinal structures are midline. The cardiac silhouette is normal in size. The osseous structures demonstrate no acute abnormality.                              EKG Readings: (Independently Interpreted)   Initial Reading: No STEMI.   Normal sinus rhythm at a rate of 73 bpm with a slight arrhythmia present. No STEMI. No T wave abnormalities.         X-Rays:   Independently Interpreted Readings:   Chest X-Ray: No obvious infiltrate, effusion, or pneumothorax.     Medical Decision Making:   Independently Interpreted Test(s):   I have ordered and independently interpreted X-rays - see prior notes.  I have ordered and independently interpreted EKG Reading(s) - see prior notes  Clinical Tests:   Lab Tests: Ordered and Reviewed  Radiological Study: Ordered  and Reviewed  Medical Tests: Ordered and Reviewed  ED Management:  Emergent evaluation of 37-year-old male with complaint of chest pain.  Patient had several negative workups here on medical record review for similar complaint.  He has been ruled out for PE, and had visits with serial troponins.  Today, workup was begun via standing orders for chest pain workup due to ED weight time.  At the time I saw him, all workup had been completed.  EKG showed no acute ischemic change.  Chest x-ray and labs including screening troponin are negative.  Since I have reviewed previous workups, I'm very comfortable with discharge home.  He is following up with his cardiologist tomorrow.  He appears anxious - I suspect this could be playing a role.  Exam did reveal some reproducible tenderness along the sternal joint, possible costochondritis.  Additionally, his chronic rash appears to be psoriasis - we'll prescribe bland emollient and topical steroids.  He can follow-up with his regular dermatologist for this.            Scribe Attestation:   Scribe #1: I performed the above scribed service and the documentation accurately describes the services I performed. I attest to the accuracy of the note.    Attending Attestation:           Physician Attestation for Scribe:  Physician Attestation Statement for Scribe #1: I, Dr. Burk, reviewed documentation, as scribed by Edna Myers in my presence, and it is both accurate and complete.                 ED Course      Clinical Impression:     1. Chest pain    2. Psoriasis                                 Radha Burk MD  01/10/18 [id number]

## 2018-06-22 ENCOUNTER — APPOINTMENT (RX ONLY)
Dept: URBAN - METROPOLITAN AREA CLINIC 98 | Facility: CLINIC | Age: 38
Setting detail: DERMATOLOGY
End: 2018-06-22

## 2018-06-22 DIAGNOSIS — L30.9 DERMATITIS, UNSPECIFIED: ICD-10-CM

## 2018-06-22 PROCEDURE — 99213 OFFICE O/P EST LOW 20 MIN: CPT

## 2018-06-22 PROCEDURE — ? PRESCRIPTION

## 2018-06-22 PROCEDURE — ? TREATMENT REGIMEN

## 2018-06-22 RX ORDER — CLOBETASOL PROPIONATE 0.5 MG/G
OINTMENT TOPICAL
Qty: 1 | Refills: 0 | Status: ERX

## 2018-06-22 RX ORDER — BETAMETHASONE DIPROPIONATE 0.5 MG/G
OINTMENT TOPICAL
Qty: 1 | Refills: 0 | Status: ERX

## 2018-06-22 RX ORDER — CALCIPOTRIENE 50 UG/G
CREAM TOPICAL
Qty: 1 | Refills: 4 | Status: ERX | COMMUNITY
Start: 2018-06-22

## 2018-06-22 RX ADMIN — CALCIPOTRIENE: 50 CREAM TOPICAL at 13:28

## 2018-06-22 ASSESSMENT — LOCATION DETAILED DESCRIPTION DERM
LOCATION DETAILED: LEFT DORSAL FOOT
LOCATION DETAILED: RIGHT RADIAL DORSAL HAND
LOCATION DETAILED: RIGHT DORSAL FOOT
LOCATION DETAILED: RIGHT ANKLE
LOCATION DETAILED: LEFT DORSAL MIDDLE METACARPOPHALANGEAL JOINT
LOCATION DETAILED: LEFT RADIAL DORSAL HAND

## 2018-06-22 ASSESSMENT — LOCATION SIMPLE DESCRIPTION DERM
LOCATION SIMPLE: LEFT FOOT
LOCATION SIMPLE: LEFT HAND
LOCATION SIMPLE: RIGHT FOOT
LOCATION SIMPLE: RIGHT ANKLE
LOCATION SIMPLE: RIGHT HAND

## 2018-06-22 ASSESSMENT — LOCATION ZONE DERM
LOCATION ZONE: HAND
LOCATION ZONE: LEG
LOCATION ZONE: FEET

## 2018-06-22 NOTE — PROCEDURE: TREATMENT REGIMEN
Otc Regimen: Neutrogena or Excipial Hand cream after every handwashing.
Initiate Treatment: Betamethasone 0.05% oint BID till out then start Clobetasol 0.05% ointment BID till clear then decrease QPM q1xqpoh/Week and start Calcipotriene cream BID [id number]/wk.\\n\\nMoisturizer after every hand washing, Rec. neutrogena Norwegian hand cream
Plan: Try switching from [employer] to non-slip tennis shoes and non-cotton socks.
Detail Level: Zone

## 2018-06-22 NOTE — HPI: RASH
How Severe Is Your Rash?: moderate
Is This A New Presentation, Or A Follow-Up?: Follow Up Rash
Additional History: Pt went to urgent care x4wks ago for flare and was give prednisone taper and hydrocortisone injection. Pt reports he uses [employer] regularly to his feet and he working in a hot environment, and his hand are wet frequently which he feels contributes to his dyshidrosis.

## 2018-09-30 ENCOUNTER — HOSPITAL ENCOUNTER (EMERGENCY)
Facility: OTHER | Age: 38
Discharge: HOME | End: 2018-09-30
Attending: EMERGENCY MEDICINE
Payer: MEDICAID

## 2018-09-30 VITALS
BODY MASS INDEX: 24.5 KG/M2 | HEART RATE: 62 BPM | TEMPERATURE: 98 F | WEIGHT: 175 LBS | OXYGEN SATURATION: 99 % | SYSTOLIC BLOOD PRESSURE: 164 MMHG | HEIGHT: 71 IN | DIASTOLIC BLOOD PRESSURE: 93 MMHG | RESPIRATION RATE: 19 BRPM

## 2018-09-30 DIAGNOSIS — R07.9 CHEST PAIN: Primary | ICD-10-CM

## 2018-09-30 DIAGNOSIS — R05.9 COUGH: ICD-10-CM

## 2018-09-30 PROCEDURE — 93010 ELECTROCARDIOGRAM REPORT: CPT | Mod: ,,, | Performed by: INTERNAL MEDICINE

## 2018-09-30 PROCEDURE — 93005 ELECTROCARDIOGRAM TRACING: CPT

## 2018-09-30 PROCEDURE — 99284 EMERGENCY DEPT VISIT MOD MDM: CPT | Mod: 25

## 2018-09-30 PROCEDURE — 25000003 PHARM REV CODE 250: Performed by: EMERGENCY MEDICINE

## 2018-09-30 RX ORDER — KETOROLAC TROMETHAMINE 10 MG/1
10 TABLET, FILM COATED ORAL
Status: COMPLETED | OUTPATIENT
Start: 2018-09-30 | End: 2018-09-30

## 2018-09-30 RX ORDER — PANTOPRAZOLE SODIUM 20 MG/1
20 TABLET, DELAYED RELEASE ORAL DAILY
Qty: 30 TABLET | Refills: 0 | Status: SHIPPED | OUTPATIENT
Start: 2018-09-30 | End: 2019-09-17 | Stop reason: ALTCHOICE

## 2018-09-30 RX ADMIN — LIDOCAINE HYDROCHLORIDE: 20 SOLUTION ORAL; TOPICAL at 05:09

## 2018-09-30 RX ADMIN — KETOROLAC TROMETHAMINE 10 MG: 10 TABLET, FILM COATED ORAL at 04:09

## 2018-09-30 NOTE — ED PROVIDER NOTES
Encounter Date: 9/30/2018    SCRIBE #1 NOTE: ICoco, am scribing for, and in the presence of, Dr. De Leon.       History     Chief Complaint   Patient presents with    Hemoptysis     coughed up blood while in shower with heartburn x 1 wk.      Time seen by provider: 4:41 PM    This is a 37 y.o. male who presents to the ED with complaint of chest pain that onset a week ago. Pt reports chest tightness and burning sensation in left chest wall. Pt reports coughing up small amount of blood today. Pt reports this has happened before. Pt reports he feels the pain when he moves his arm. Pt denies shortness of breath.        The history is provided by the patient.     Review of patient's allergies indicates:   Allergen Reactions    Vancomycin analogues      History reviewed. No pertinent past medical history.  Past Surgical History:   Procedure Laterality Date    FRACTURE SURGERY      nose     History reviewed. No pertinent family history.  Social History     Tobacco Use    Smoking status: Current Every Day Smoker     Packs/day: 1.00     Types: Cigarettes    Smokeless tobacco: Never Used   Substance Use Topics    Alcohol use: Yes     Alcohol/week: 2.4 - 3.0 oz     Types: 4 - 5 Cans of beer per week     Comment: twice weekly    Drug use: No     Review of Systems   Constitutional: Negative for fever.   HENT: Negative for sore throat.    Respiratory: Positive for cough (with blood) and chest tightness. Negative for shortness of breath.    Cardiovascular: Positive for chest pain.   Gastrointestinal: Negative for nausea.   Genitourinary: Negative for dysuria.   Musculoskeletal: Negative for back pain.   Skin: Negative for rash.   Neurological: Negative for weakness.   Hematological: Does not bruise/bleed easily.       Physical Exam     Initial Vitals [09/30/18 1634]   BP Pulse Resp Temp SpO2   (!) 164/93 65 18 98 °F (36.7 °C) 99 %      MAP       --         Physical Exam    Nursing note and vitals  reviewed.  Constitutional: He appears well-developed and well-nourished. He is not diaphoretic. No distress.   HENT:   Head: Normocephalic and atraumatic.   Eyes: Conjunctivae and EOM are normal.   Neck: Normal range of motion. Neck supple.   Cardiovascular: Normal rate, regular rhythm and normal heart sounds.   Pulmonary/Chest: Breath sounds normal. No respiratory distress.   Tenderness to left anterior chest wall, pain elicited with range of left shoulder.   Abdominal: Soft. Bowel sounds are normal.   No epigastric tenderness.   Musculoskeletal: Normal range of motion.   Neurological: He is alert and oriented to person, place, and time.   Skin: Skin is warm and dry.   Psychiatric: He has a normal mood and affect. His behavior is normal. Judgment and thought content normal.         ED Course   Procedures  Labs Reviewed - No data to display  EKG Readings: (Independently Interpreted)   Initial Reading: No STEMI.   Normal sinus rhythm at a rate of 61 bpm. Normal axis. Normal intervals. Unchanged from 01/09/2018.       Imaging Results          X-Ray Chest PA And Lateral (Final result)  Result time 09/30/18 17:14:00    Final result by Jose Houston MD (09/30/18 17:14:00)                 Impression:      No acute cardiopulmonary process identified.      Electronically signed by: Jose Houston MD  Date:    09/30/2018  Time:    17:14             Narrative:    EXAMINATION:  XR CHEST PA AND LATERAL    CLINICAL HISTORY:  Cough    TECHNIQUE:  PA and lateral views of the chest were performed.    COMPARISON:  01/09/2018.    FINDINGS:  Cardiac silhouette is normal in size.  Lungs are symmetrically expanded.  No evidence of focal consolidative process, pneumothorax, or significant effusion.  No acute osseous abnormality identified.                              X-Rays:   Independently Interpreted Readings:   Chest X-Ray: Normal. No effusion. No pneumothorax. No focal infiltrates. No cardiomegaly.     Medical Decision Making:  "  Initial Assessment:     Urgent evaluation of 38 yo M here with complaints of L sided chest tightness and scant hemptysis today, possible "heartburn".  Per epic review, patient has been seen in the emergency department several times for similar complaint.  Patient has been ruled out for PE previously, serial negative troponins, no hx of DVT or PE. Here pt is well appearing, non toxic, denies drug use, though is a tobacco smoker, no family hx of known cardiac dz. Exam w reproducible chest wall tenderness. Suspect possible MSK v anxiety component, esophagitis v gerd w low suspicion for serious cardiopulm pathology at this time. EKG non ischemic and will obtain CXR, admin gi cocktail and reassess.   Clinical Tests:   Radiological Study: Ordered and Reviewed  Medical Tests: Ordered and Reviewed            Scribe Attestation:   Scribe #1: I performed the above scribed service and the documentation accurately describes the services I performed. I attest to the accuracy of the note.    Attending Attestation:           Physician Attestation for Scribe:  Physician Attestation Statement for Scribe #1: I, Dr. Hanson, reviewed documentation, as scribed by Coco Dinh in my presence, and it is both accurate and complete.                    Clinical Impression:     1. Chest pain    2. Cough            Disposition:   Disposition: Discharged                        Valencia De Leon MD  09/30/18 [id number]    "

## 2018-09-30 NOTE — ED NOTES
"Pt presents to ED with c/o left chest pressure x several months, with worsening over the last week. Pt reporting he had negative stress test x 6 mos ago.  Pt also with reporting tingling sensation in L jaw and throughout L arm. Pt moving all extremities well. Pt denies PMH. Reporting he was in "shower earlier today and I coughed up some blood, I think my nose was just stopped up though." Denies nausea, vomiting, fever, chills, productive cough, or SOB. Pt AAOx4 and appropriate at this time. Respirations even and unlabored. No acute distress noted.    "

## 2019-09-17 ENCOUNTER — OFFICE VISIT (OUTPATIENT)
Dept: URGENT CARE | Facility: CLINIC | Age: 39
End: 2019-09-17
Payer: COMMERCIAL

## 2019-09-17 VITALS — HEIGHT: 71 IN | WEIGHT: 175 LBS | BODY MASS INDEX: 24.5 KG/M2

## 2019-09-17 DIAGNOSIS — J02.9 PHARYNGITIS, UNSPECIFIED ETIOLOGY: Primary | ICD-10-CM

## 2019-09-17 LAB
CTP QC/QA: YES
S PYO RRNA THROAT QL PROBE: NEGATIVE

## 2019-09-17 PROCEDURE — 87880 POCT RAPID STREP A: ICD-10-PCS | Mod: QW,S$GLB,, | Performed by: STUDENT IN AN ORGANIZED HEALTH CARE EDUCATION/TRAINING PROGRAM

## 2019-09-17 PROCEDURE — 99000 PR SPECIMEN HANDLING,DR OFF->LAB: ICD-10-PCS | Mod: S$GLB,,, | Performed by: STUDENT IN AN ORGANIZED HEALTH CARE EDUCATION/TRAINING PROGRAM

## 2019-09-17 PROCEDURE — 99203 PR OFFICE/OUTPT VISIT, NEW, LEVL III, 30-44 MIN: ICD-10-PCS | Mod: S$GLB,,, | Performed by: STUDENT IN AN ORGANIZED HEALTH CARE EDUCATION/TRAINING PROGRAM

## 2019-09-17 PROCEDURE — 99000 SPECIMEN HANDLING OFFICE-LAB: CPT | Mod: S$GLB,,, | Performed by: STUDENT IN AN ORGANIZED HEALTH CARE EDUCATION/TRAINING PROGRAM

## 2019-09-17 PROCEDURE — 87880 STREP A ASSAY W/OPTIC: CPT | Mod: QW,S$GLB,, | Performed by: STUDENT IN AN ORGANIZED HEALTH CARE EDUCATION/TRAINING PROGRAM

## 2019-09-17 PROCEDURE — 99203 OFFICE O/P NEW LOW 30 MIN: CPT | Mod: S$GLB,,, | Performed by: STUDENT IN AN ORGANIZED HEALTH CARE EDUCATION/TRAINING PROGRAM

## 2019-09-17 PROCEDURE — 3008F PR BODY MASS INDEX (BMI) DOCUMENTED: ICD-10-PCS | Mod: CPTII,S$GLB,, | Performed by: STUDENT IN AN ORGANIZED HEALTH CARE EDUCATION/TRAINING PROGRAM

## 2019-09-17 PROCEDURE — 87081 CULTURE SCREEN ONLY: CPT

## 2019-09-17 PROCEDURE — 3008F BODY MASS INDEX DOCD: CPT | Mod: CPTII,S$GLB,, | Performed by: STUDENT IN AN ORGANIZED HEALTH CARE EDUCATION/TRAINING PROGRAM

## 2019-09-17 RX ORDER — IBUPROFEN 600 MG/1
600 TABLET ORAL EVERY 8 HOURS PRN
Qty: 15 TABLET | Refills: 0 | Status: SHIPPED | OUTPATIENT
Start: 2019-09-17 | End: 2019-09-22

## 2019-09-17 RX ORDER — AMOXICILLIN 500 MG/1
500 TABLET, FILM COATED ORAL EVERY 12 HOURS
Qty: 20 TABLET | Refills: 0 | Status: SHIPPED | OUTPATIENT
Start: 2019-09-17 | End: 2019-09-27

## 2019-09-17 NOTE — PROGRESS NOTES
"Subjective:       Patient ID: Raymundo Abebe is a 38 y.o. male.    Vitals:  height is 5' 11" (1.803 m) and weight is 79.4 kg (175 lb).     Chief Complaint: No chief complaint on file.    Patient presents with c/o sore throat that started yesterday. Notes pain when swallowing and associated subjective fever with chills. Pain is not worse on a particular side. Patient just had a new born yesterday and would like to r/o strep.    Sore Throat    This is a new problem. The current episode started yesterday. The problem has been unchanged. Neither side of throat is experiencing more pain than the other. Maximum temperature: subjective. The fever has been present for less than 1 day. The pain is at a severity of 4/10. The pain is mild. Pertinent negatives include no abdominal pain, congestion, coughing, diarrhea, drooling, ear pain, headaches, hoarse voice, plugged ear sensation, shortness of breath, stridor, trouble swallowing or vomiting. He has had no exposure to strep or mono. Treatments tried: vitamin c. The treatment provided no relief.       Constitution: Positive for chills and fever. Negative for sweating and fatigue.   HENT: Positive for sore throat. Negative for ear pain, drooling, congestion, sinus pain, sinus pressure, trouble swallowing and voice change.    Neck: Negative for painful lymph nodes.   Cardiovascular: Negative for chest pain.   Eyes: Negative for eye discharge, eye itching and eye redness.   Respiratory: Negative for cough, sputum production, shortness of breath, stridor and wheezing.    Gastrointestinal: Negative for abdominal pain, nausea, vomiting and diarrhea.   Musculoskeletal: Negative for joint pain, joint swelling and muscle ache.   Skin: Negative for color change, rash and lesion.   Allergic/Immunologic: Negative for seasonal allergies.   Neurological: Negative for dizziness, light-headedness and headaches.   Hematologic/Lymphatic: Negative for swollen lymph nodes. "   Psychiatric/Behavioral: Negative for confusion, agitation and sleep disturbance.       Objective:      Physical Exam   Constitutional: He is oriented to person, place, and time. He appears well-developed and well-nourished. No distress.   HENT:   Head: Normocephalic and atraumatic.   Right Ear: Hearing, tympanic membrane, external ear and ear canal normal.   Left Ear: Hearing, tympanic membrane, external ear and ear canal normal.   Nose: No mucosal edema or rhinorrhea. Right sinus exhibits no maxillary sinus tenderness and no frontal sinus tenderness. Left sinus exhibits no maxillary sinus tenderness and no frontal sinus tenderness.   Mouth/Throat: Uvula is midline and mucous membranes are normal. Posterior oropharyngeal erythema present. No oropharyngeal exudate, posterior oropharyngeal edema or tonsillar abscesses. Tonsils are 2+ on the right. Tonsils are 2+ on the left. No tonsillar exudate.   Eyes: Conjunctivae are normal. Right eye exhibits no discharge. Left eye exhibits no discharge.   Neck: Normal range of motion. Neck supple.   Cardiovascular: Normal rate, regular rhythm and normal heart sounds.   Pulmonary/Chest: Effort normal and breath sounds normal. No stridor. He has no wheezes. He has no rales.   Abdominal: Soft. Bowel sounds are normal. He exhibits no distension. There is no tenderness.   Musculoskeletal: Normal range of motion. He exhibits no edema.   Lymphadenopathy:     He has no cervical adenopathy.   Neurological: He is alert and oriented to person, place, and time. No cranial nerve deficit (grossly intact).   Skin: Skin is warm and dry. No rash noted.   Psychiatric: He has a normal mood and affect. His behavior is normal. Judgment and thought content normal.   Vitals reviewed.      Recent Lab Results       09/17/19  1620         Acceptable Yes     RAPID STREP A SCREEN Negative          Assessment:       1. Pharyngitis, unspecified etiology        Plan:         Pharyngitis,  unspecified etiology  -     POCT rapid strep A  -     Strep A culture, throat  -     amoxicillin (AMOXIL) 500 MG Tab; Take 1 tablet (500 mg total) by mouth every 12 (twelve) hours. for 10 days  Dispense: 20 tablet; Refill: 0  -     ibuprofen (ADVIL,MOTRIN) 600 MG tablet; Take 1 tablet (600 mg total) by mouth every 8 (eight) hours as needed.  Dispense: 15 tablet; Refill: 0  - in setting of  will treat empirically for strep pharyngitis; may d/c abx if culture negative    Results, medications and diagnosis reviewed with patient, questions answered, and return precautions given    Follow up if symptoms worsen or fail to improve.    Ha Can MD/MPH  [facility]  [facility]

## 2019-09-17 NOTE — PATIENT INSTRUCTIONS
Pharyngitis: Strep (Presumed)    You have pharyngitis (sore throat). The cause is thought to be the streptococcus, or strep, bacterium. Strep throat infection can cause throat pain that is worse when swallowing, aching all over, headache, and fever. The infection may be spread by coughing, kissing, or touching others after touching your mouth or nose. Antibiotic medications are given to treat the infection.  Home care  · Rest at home. Drink plenty of fluids to avoid dehydration.  · No work or school for the first 2 days of taking the antibiotics. After this time, you will not be contagious. You can then return to work or school if you are feeling better.   · The antibiotic medication must be taken for the full 10 days, even if you feel better. This is very important to ensure the infection is treated. It is also important to prevent drug-resistant organisms from developing. If you were given an antibiotic shot, no more antibiotics are needed.  · You may use acetaminophen or ibuprofen to control pain or fever, unless another medicine was prescribed for this. If you have chronic liver or kidney disease or ever had a stomach ulcer or GI bleeding, talk with your doctor before using these medicines.  · Throat lozenges or a throat-numbing sprays can help reduce throat pain. Gargling with warm salt water can also help. Dissolve 1/2 teaspoon of salt in 1 8 ounce glass of warm water.   · Avoid salty or spicy foods, which can irritate the throat.  Follow-up care  Follow up with your healthcare provider or our staff if you are not improving over the next week.  When to seek medical advice  Call your healthcare provider right away if any of these occur:  · Fever as directed by your doctor.   · New or worsening ear pain, sinus pain, or headache  · Painful lumps in the back of neck  · Stiff neck  · Lymph nodes are getting larger  · Inability to swallow liquids, excessive drooling, or inability to open mouth wide due to throat  pain  · Signs of dehydration (very dark urine or no urine, sunken eyes, dizziness)  · Trouble breathing or noisy breathing  · Muffled voice  · New rash  Date Last Reviewed: 4/13/2015  © 1655-9449 [employer]. [address] [place] [address], [place] [address]. All rights reserved. This information is not intended as a substitute for professional medical care. Always follow your healthcare professional's instructions.

## 2019-09-20 LAB — BACTERIA THROAT CULT: NORMAL

## 2019-09-21 ENCOUNTER — TELEPHONE (OUTPATIENT)
Dept: URGENT CARE | Facility: CLINIC | Age: 39
End: 2019-09-21

## 2019-09-21 NOTE — TELEPHONE ENCOUNTER
I reported negative throat culture result to patient.  He reports feeling better.  He was advised to discontinue the amoxicillin, which he plans to do.

## 2019-11-26 ENCOUNTER — HOSPITAL ENCOUNTER (EMERGENCY)
Facility: OTHER | Age: 39
Discharge: HOME | End: 2019-11-26
Attending: EMERGENCY MEDICINE
Payer: COMMERCIAL

## 2019-11-26 VITALS
RESPIRATION RATE: 18 BRPM | SYSTOLIC BLOOD PRESSURE: 115 MMHG | HEART RATE: 72 BPM | HEIGHT: 71 IN | TEMPERATURE: 98 F | OXYGEN SATURATION: 95 % | DIASTOLIC BLOOD PRESSURE: 77 MMHG | BODY MASS INDEX: 24.5 KG/M2 | WEIGHT: 175 LBS

## 2019-11-26 DIAGNOSIS — R07.9 CHEST PAIN: ICD-10-CM

## 2019-11-26 DIAGNOSIS — R07.89 CHEST WALL PAIN: Primary | ICD-10-CM

## 2019-11-26 LAB
ANION GAP SERPL CALC-SCNC: 11 MMOL/L (ref 8–16)
BASOPHILS # BLD AUTO: 0.07 K/UL (ref 0–0.2)
BASOPHILS NFR BLD: 0.6 % (ref 0–1.9)
BUN SERPL-MCNC: 10 MG/DL (ref 6–20)
CALCIUM SERPL-MCNC: 10.2 MG/DL (ref 8.7–10.5)
CHLORIDE SERPL-SCNC: 104 MMOL/L (ref 95–110)
CO2 SERPL-SCNC: 27 MMOL/L (ref 23–29)
CREAT SERPL-MCNC: 0.9 MG/DL (ref 0.5–1.4)
D DIMER PPP IA.FEU-MCNC: 0.24 MG/L FEU
DIFFERENTIAL METHOD: ABNORMAL
EOSINOPHIL # BLD AUTO: 0.1 K/UL (ref 0–0.5)
EOSINOPHIL NFR BLD: 1 % (ref 0–8)
ERYTHROCYTE [DISTWIDTH] IN BLOOD BY AUTOMATED COUNT: 13.3 % (ref 11.5–14.5)
EST. GFR  (AFRICAN AMERICAN): >60 ML/MIN/1.73 M^2
EST. GFR  (NON AFRICAN AMERICAN): >60 ML/MIN/1.73 M^2
GLUCOSE SERPL-MCNC: 114 MG/DL (ref 70–110)
HCT VFR BLD AUTO: 46.9 % (ref 40–54)
HGB BLD-MCNC: 15.8 G/DL (ref 14–18)
IMM GRANULOCYTES # BLD AUTO: 0.05 K/UL (ref 0–0.04)
IMM GRANULOCYTES NFR BLD AUTO: 0.4 % (ref 0–0.5)
LYMPHOCYTES # BLD AUTO: 1.9 K/UL (ref 1–4.8)
LYMPHOCYTES NFR BLD: 15.5 % (ref 18–48)
MCH RBC QN AUTO: 29.9 PG (ref 27–31)
MCHC RBC AUTO-ENTMCNC: 33.7 G/DL (ref 32–36)
MCV RBC AUTO: 89 FL (ref 82–98)
MONOCYTES # BLD AUTO: 0.9 K/UL (ref 0.3–1)
MONOCYTES NFR BLD: 7 % (ref 4–15)
NEUTROPHILS # BLD AUTO: 9.4 K/UL (ref 1.8–7.7)
NEUTROPHILS NFR BLD: 75.5 % (ref 38–73)
NRBC BLD-RTO: 0 /100 WBC
PLATELET # BLD AUTO: 237 K/UL (ref 150–350)
PMV BLD AUTO: 10.9 FL (ref 9.2–12.9)
POCT GLUCOSE: 107 MG/DL (ref 70–110)
POCT GLUCOSE: 93 MG/DL (ref 70–110)
POTASSIUM SERPL-SCNC: 4.1 MMOL/L (ref 3.5–5.1)
RBC # BLD AUTO: 5.28 M/UL (ref 4.6–6.2)
SODIUM SERPL-SCNC: 142 MMOL/L (ref 136–145)
TROPONIN I SERPL DL<=0.01 NG/ML-MCNC: <0.006 NG/ML (ref 0–0.03)
WBC # BLD AUTO: 12.42 K/UL (ref 3.9–12.7)

## 2019-11-26 PROCEDURE — 93005 ELECTROCARDIOGRAM TRACING: CPT

## 2019-11-26 PROCEDURE — 93010 ELECTROCARDIOGRAM REPORT: CPT | Mod: ,,, | Performed by: INTERNAL MEDICINE

## 2019-11-26 PROCEDURE — 84484 ASSAY OF TROPONIN QUANT: CPT

## 2019-11-26 PROCEDURE — 80048 BASIC METABOLIC PNL TOTAL CA: CPT

## 2019-11-26 PROCEDURE — 99285 EMERGENCY DEPT VISIT HI MDM: CPT | Mod: 25

## 2019-11-26 PROCEDURE — 85025 COMPLETE CBC W/AUTO DIFF WBC: CPT

## 2019-11-26 PROCEDURE — 85379 FIBRIN DEGRADATION QUANT: CPT

## 2019-11-26 PROCEDURE — 93010 EKG 12-LEAD: ICD-10-PCS | Mod: ,,, | Performed by: INTERNAL MEDICINE

## 2019-11-26 PROCEDURE — 82962 GLUCOSE BLOOD TEST: CPT

## 2019-11-26 RX ORDER — IBUPROFEN 600 MG/1
600 TABLET ORAL EVERY 6 HOURS PRN
Qty: 20 TABLET | Refills: 0 | Status: SHIPPED | OUTPATIENT
Start: 2019-11-26

## 2019-11-26 NOTE — ED NOTES
Pt to er with c/o left side upper chest pain and diaphoresis and dizziness and palpations,while driving car. Pt was able to transport self to er and with decreased pain now. Pt aaox3 skin warm and dry lungs clear bilaterally air movement good. Pt heart rate regular rate. No peripheral edema noted . Pt ambulated to room without difficulty.

## 2019-11-26 NOTE — ED PROVIDER NOTES
"  Encounter Date: 11/26/2019    SCRIBE #1 NOTE: I, Elham Christiano, am scribing for, and in the presence of, Dr. Lombardi.       History     Chief Complaint   Patient presents with    Chest Pain     Pt c/o sudden onset of mid sternal & left sided CP 10 mins PTA. Pt reports feeling hot, left shoulder "twitch" & dry mouth. Pt denies diaphoresis, SOB & N/V.     Time seen by provider: 12:02 PM    This is a 38 y.o. male who presents with complaint of left sided chest pain for the past hour. He states that he was in the car driving and began to feel sweaty and mildly short of breath with a dry mouth, then felt palpitations and "muscle twitching" in left shoulder and axilla. He states that chest pain was "achy" at a 4 or 5/10 severity and made him anxious. Wife at bedside states that he is normally "even-keeled" and not anxious. He denies any exacerbation with ambulation. His pain is still "achy" but now at 1 or 2/10 severity and he feels generally weak. He reports similar episodes of chest pain 2 years ago and had stress test performed finding somewhat elevated blood pressure but otherwise unremarkable results. He denies any hx of HTN, DM, HLD, or familial cardiac history. He smokes cigarettes. He denies any illicit drug use. He drinks alcohol occasionally. He does not take any daily medications. He denies any recent long car rides or flights. 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 He adds that he saw a chiropractor some time ago who noted that left rib cage is slightly shifted forward. He denies any N/V or leg swelling.    The history is provided by the patient, the spouse and a friend.     Review of patient's allergies indicates:   Allergen Reactions    Vancomycin analogues      History reviewed. No pertinent past medical history.  Past Surgical History:   Procedure Laterality Date    FRACTURE SURGERY      nose     History reviewed. No pertinent family " history.  Social History     Tobacco Use    Smoking status: Current Every Day Smoker     Packs/day: 1.00     Types: Cigarettes    Smokeless tobacco: Never Used   Substance Use Topics    Alcohol use: Yes     Alcohol/week: 4.0 - 5.0 standard drinks     Types: 4 - 5 Cans of beer per week     Comment: twice weekly    Drug use: No     Review of Systems   Constitutional: Positive for diaphoresis (resolved). Negative for activity change, appetite change, chills and fever.   HENT: Negative for congestion and sinus pressure.         Notes dry mouth.   Eyes: Negative for discharge.   Respiratory: Positive for shortness of breath (mild, resolved). Negative for cough, choking and chest tightness.    Cardiovascular: Positive for chest pain (left) and palpitations. Negative for leg swelling.   Gastrointestinal: Negative for abdominal pain, nausea and vomiting.   Genitourinary: Negative for difficulty urinating and dysuria.   Musculoskeletal: Negative for arthralgias.        Notes left shoulder and left axilla spasm (resolved).   Skin: Negative for color change.   Neurological: Positive for weakness (generalized). Negative for dizziness and headaches.   Hematological: Does not bruise/bleed easily.   Psychiatric/Behavioral: The patient is nervous/anxious (resolved).        Physical Exam     Initial Vitals [11/26/19 1132]   BP Pulse Resp Temp SpO2   (!) 149/100 106 18 98.7 °F (37.1 °C) 96 %      MAP       --         Physical Exam    Nursing note and vitals reviewed.  Constitutional: He appears well-developed and well-nourished.  Non-toxic appearance. He does not have a sickly appearance. No distress.   HENT:   Head: Normocephalic and atraumatic.   Nose: Nose normal.   Mouth/Throat: Oropharynx is clear and moist.   Eyes: Conjunctivae, EOM and lids are normal. Pupils are equal, round, and reactive to light. Right eye exhibits no nystagmus. Left eye exhibits no nystagmus.   Neck: Trachea normal, normal range of motion and phonation  normal. Neck supple. No stridor present.   Cardiovascular: Normal rate, regular rhythm, normal heart sounds and normal pulses. Exam reveals no gallop and no friction rub.    No murmur heard.  Pulses:       Radial pulses are 2+ on the right side, and 2+ on the left side.   Pulmonary/Chest: Breath sounds normal. No respiratory distress. He has no wheezes. He has no rhonchi. He has no rales.   Reproducible TTP of left chest wall.   Abdominal: Soft. Normal appearance and bowel sounds are normal. He exhibits no distension. There is no tenderness. There is no rigidity, no rebound, no guarding, no tenderness at McBurney's point and negative Paulino's sign.   Musculoskeletal:   No midline tenderness, step-offs or deformities of the cervical, thoracic or lumbar spine.   Neurological: He is alert and oriented to person, place, and time. He has normal strength and normal reflexes. He displays normal reflexes. No cranial nerve deficit or sensory deficit. He displays a negative Romberg sign. GCS eye subscore is 4. GCS verbal subscore is 5. GCS motor subscore is 6.   Skin: Skin is warm, dry and intact. Capillary refill takes less than 2 seconds. No rash noted. No pallor.   Psychiatric: His speech is normal and behavior is normal.         ED Course   Procedures  Labs Reviewed   CBC W/ AUTO DIFFERENTIAL - Abnormal; Notable for the following components:       Result Value    Gran # (ANC) 9.4 (*)     Immature Grans (Abs) 0.05 (*)     Gran% 75.5 (*)     Lymph% 15.5 (*)     All other components within normal limits   BASIC METABOLIC PANEL - Abnormal; Notable for the following components:    Glucose 114 (*)     All other components within normal limits   TROPONIN I   D DIMER, QUANTITATIVE   POCT GLUCOSE   POCT GLUCOSE     EKG Readings: (Independently Interpreted)   Sinus tachycardia at rate of 110. Normal intervals. Normal QRS. No acute ST or T wave abnormalities. Compared to 9/30/2018, shows no change.       Imaging Results           X-Ray Chest PA And Lateral (Final result)  Result time 11/26/19 13:00:03    Final result by Brad Garcia MD (11/26/19 13:00:03)                 Impression:      1. No acute cardiopulmonary process.      Electronically signed by: Brad Garcia MD  Date:    11/26/2019  Time:    13:00             Narrative:    EXAMINATION:  XR CHEST PA AND LATERAL    CLINICAL HISTORY:  Chest pain, unspecified    TECHNIQUE:  PA and lateral views of the chest were performed.    COMPARISON:  09/30/2018    FINDINGS:  The cardiomediastinal silhouette is not enlarged.  There is no pleural effusion.  The trachea is midline.  The lungs are symmetrically expanded bilaterally without evidence of acute parenchymal process. No large focal consolidation seen.  There is no pneumothorax.  The osseous structures are unremarkable.                              X-Rays:   Independently Interpreted Readings:   Chest X-Ray: Normal heart size. No infiltrate. No bony deformity. No acute disease.     Medical Decision Making:   History:   Old Medical Records: I decided to obtain old medical records.  Initial Assessment:   38-year-old male no past medical history other than smoking presents with chest pain.  On my examination as well as history appears to be musculoskeletal.  He has no risk factors for coronary artery disease other than smoking.  No family history.  Will get labs, EKG and a chest x-ray.  He is little tachycardic upon arrival.  As I talk to him a note that he is heart rate does go down to normal.  Likely secondary to stress.  I feel with no other real reason for his symptoms I feel a D-dimer is indicated as he is low risk for pulmonary embolus but is positive on the perc score.  History is inconsistent with pneumonia.  Do not suspect aortic dissection and low risk for spontaneous pneumothorax.  Independently Interpreted Test(s):   I have ordered and independently interpreted X-rays - see prior notes.  I have ordered and independently  interpreted EKG Reading(s) - see prior notes  Clinical Tests:   Lab Tests: Ordered and Reviewed  Radiological Study: Ordered and Reviewed  Medical Tests: Ordered and Reviewed            Scribe Attestation:   Scribe #1: I performed the above scribed service and the documentation accurately describes the services I performed. I attest to the accuracy of the note.    Attending Attestation:           Physician Attestation for Scribe:  Physician Attestation Statement for Scribe #1: I, Dr. Lombardi, reviewed documentation, as scribed by Elham Alvarado in my presence, and it is both accurate and complete.                 ED Course as of Nov 26 2154   Tue Nov 26, 2019   1400 D-dimer is negative. CBC is normal. Pending chemistries as well as troponin.  Chest x-ray negative. Updated the patient as well as his wife who is at bedside.    [SM]   1407 BMP reviewed which is normal.    [SM]   1414 A troponin is negative.    [SM]   1421 Discussed the results with the patient as well as his wife is at bedside.  I have answered all questions.  Do not feel any further workup is indicated here in the emergency department today.    Patient discharged home in stable condition. Diagnosis and treatment plan explained to patient and/or family member who is at bedside. I have answered all questions and the patient is satisfied with the plan of care. The patient demonstrates understanding of the care plan. This is the extent to the patients complaints today here in the emergency department.    [SM]      ED Course User Index  [SM] Carlito Lombardi DO                Clinical Impression:     1. Chest wall pain    2. Chest pain                                Carlito Lombardi DO  11/26/19 2154

## 2022-06-09 ENCOUNTER — OFFICE VISIT (OUTPATIENT)
Dept: INTERNAL MEDICINE | Facility: CLINIC | Age: 42
End: 2022-06-09
Payer: COMMERCIAL

## 2022-06-09 ENCOUNTER — LAB VISIT (OUTPATIENT)
Dept: LAB | Facility: OTHER | Age: 42
End: 2022-06-09
Attending: STUDENT IN AN ORGANIZED HEALTH CARE EDUCATION/TRAINING PROGRAM
Payer: COMMERCIAL

## 2022-06-09 VITALS
HEART RATE: 90 BPM | SYSTOLIC BLOOD PRESSURE: 128 MMHG | WEIGHT: 182.56 LBS | OXYGEN SATURATION: 99 % | BODY MASS INDEX: 25.46 KG/M2 | DIASTOLIC BLOOD PRESSURE: 84 MMHG

## 2022-06-09 DIAGNOSIS — Z11.59 ENCOUNTER FOR HEPATITIS C SCREENING TEST FOR LOW RISK PATIENT: ICD-10-CM

## 2022-06-09 DIAGNOSIS — Z00.00 HEALTH MAINTENANCE EXAMINATION: ICD-10-CM

## 2022-06-09 DIAGNOSIS — Z13.6 SCREENING FOR CARDIOVASCULAR CONDITION: ICD-10-CM

## 2022-06-09 DIAGNOSIS — Z13.1 SCREENING FOR DIABETES MELLITUS: ICD-10-CM

## 2022-06-09 DIAGNOSIS — Z23 NEED FOR VACCINATION: ICD-10-CM

## 2022-06-09 DIAGNOSIS — Z11.4 SCREENING FOR HIV WITHOUT PRESENCE OF RISK FACTORS: ICD-10-CM

## 2022-06-09 DIAGNOSIS — R07.9 RECURRENT CHEST PAIN: ICD-10-CM

## 2022-06-09 DIAGNOSIS — F17.210 CIGARETTE NICOTINE DEPENDENCE WITHOUT COMPLICATION: ICD-10-CM

## 2022-06-09 DIAGNOSIS — R07.9 RECURRENT CHEST PAIN: Primary | ICD-10-CM

## 2022-06-09 LAB
ALBUMIN SERPL BCP-MCNC: 4.4 G/DL (ref 3.5–5.2)
ALP SERPL-CCNC: 67 U/L (ref 55–135)
ALT SERPL W/O P-5'-P-CCNC: 45 U/L (ref 10–44)
ANION GAP SERPL CALC-SCNC: 12 MMOL/L (ref 8–16)
AST SERPL-CCNC: 28 U/L (ref 10–40)
BASOPHILS # BLD AUTO: 0.04 K/UL (ref 0–0.2)
BASOPHILS NFR BLD: 0.6 % (ref 0–1.9)
BILIRUB SERPL-MCNC: 0.6 MG/DL (ref 0.1–1)
BUN SERPL-MCNC: 10 MG/DL (ref 6–20)
CALCIUM SERPL-MCNC: 10.1 MG/DL (ref 8.7–10.5)
CHLORIDE SERPL-SCNC: 104 MMOL/L (ref 95–110)
CHOLEST SERPL-MCNC: 244 MG/DL (ref 120–199)
CHOLEST/HDLC SERPL: 4.8 {RATIO} (ref 2–5)
CO2 SERPL-SCNC: 26 MMOL/L (ref 23–29)
CREAT SERPL-MCNC: 0.8 MG/DL (ref 0.5–1.4)
DIFFERENTIAL METHOD: ABNORMAL
EOSINOPHIL # BLD AUTO: 0.2 K/UL (ref 0–0.5)
EOSINOPHIL NFR BLD: 3.4 % (ref 0–8)
ERYTHROCYTE [DISTWIDTH] IN BLOOD BY AUTOMATED COUNT: 13.5 % (ref 11.5–14.5)
EST. GFR  (AFRICAN AMERICAN): >60 ML/MIN/1.73 M^2
EST. GFR  (NON AFRICAN AMERICAN): >60 ML/MIN/1.73 M^2
ESTIMATED AVG GLUCOSE: 108 MG/DL (ref 68–131)
GLUCOSE SERPL-MCNC: 100 MG/DL (ref 70–110)
HBA1C MFR BLD: 5.4 % (ref 4–5.6)
HCT VFR BLD AUTO: 45.7 % (ref 40–54)
HDLC SERPL-MCNC: 51 MG/DL (ref 40–75)
HDLC SERPL: 20.9 % (ref 20–50)
HGB BLD-MCNC: 15.4 G/DL (ref 14–18)
IMM GRANULOCYTES # BLD AUTO: 0.03 K/UL (ref 0–0.04)
IMM GRANULOCYTES NFR BLD AUTO: 0.4 % (ref 0–0.5)
LDLC SERPL CALC-MCNC: 164 MG/DL (ref 63–159)
LYMPHOCYTES # BLD AUTO: 1.9 K/UL (ref 1–4.8)
LYMPHOCYTES NFR BLD: 27.3 % (ref 18–48)
MCH RBC QN AUTO: 31.7 PG (ref 27–31)
MCHC RBC AUTO-ENTMCNC: 33.7 G/DL (ref 32–36)
MCV RBC AUTO: 94 FL (ref 82–98)
MONOCYTES # BLD AUTO: 0.6 K/UL (ref 0.3–1)
MONOCYTES NFR BLD: 8.4 % (ref 4–15)
NEUTROPHILS # BLD AUTO: 4.2 K/UL (ref 1.8–7.7)
NEUTROPHILS NFR BLD: 59.9 % (ref 38–73)
NONHDLC SERPL-MCNC: 193 MG/DL
NRBC BLD-RTO: 0 /100 WBC
PLATELET # BLD AUTO: 216 K/UL (ref 150–450)
PMV BLD AUTO: 10 FL (ref 9.2–12.9)
POTASSIUM SERPL-SCNC: 4.3 MMOL/L (ref 3.5–5.1)
PROT SERPL-MCNC: 7.4 G/DL (ref 6–8.4)
RBC # BLD AUTO: 4.86 M/UL (ref 4.6–6.2)
SODIUM SERPL-SCNC: 142 MMOL/L (ref 136–145)
TRIGL SERPL-MCNC: 145 MG/DL (ref 30–150)
TSH SERPL DL<=0.005 MIU/L-ACNC: 0.88 UIU/ML (ref 0.4–4)
WBC # BLD AUTO: 7.06 K/UL (ref 3.9–12.7)

## 2022-06-09 PROCEDURE — 99385 PR PREVENTIVE VISIT,NEW,18-39: ICD-10-PCS | Mod: S$GLB,,, | Performed by: STUDENT IN AN ORGANIZED HEALTH CARE EDUCATION/TRAINING PROGRAM

## 2022-06-09 PROCEDURE — 99385 PREV VISIT NEW AGE 18-39: CPT | Mod: S$GLB,,, | Performed by: STUDENT IN AN ORGANIZED HEALTH CARE EDUCATION/TRAINING PROGRAM

## 2022-06-09 PROCEDURE — 3044F PR MOST RECENT HEMOGLOBIN A1C LEVEL <7.0%: ICD-10-PCS | Mod: CPTII,S$GLB,, | Performed by: STUDENT IN AN ORGANIZED HEALTH CARE EDUCATION/TRAINING PROGRAM

## 2022-06-09 PROCEDURE — 85025 COMPLETE CBC W/AUTO DIFF WBC: CPT | Performed by: STUDENT IN AN ORGANIZED HEALTH CARE EDUCATION/TRAINING PROGRAM

## 2022-06-09 PROCEDURE — 87389 HIV-1 AG W/HIV-1&-2 AB AG IA: CPT | Performed by: STUDENT IN AN ORGANIZED HEALTH CARE EDUCATION/TRAINING PROGRAM

## 2022-06-09 PROCEDURE — 1160F RVW MEDS BY RX/DR IN RCRD: CPT | Mod: CPTII,S$GLB,, | Performed by: STUDENT IN AN ORGANIZED HEALTH CARE EDUCATION/TRAINING PROGRAM

## 2022-06-09 PROCEDURE — 83036 HEMOGLOBIN GLYCOSYLATED A1C: CPT | Performed by: STUDENT IN AN ORGANIZED HEALTH CARE EDUCATION/TRAINING PROGRAM

## 2022-06-09 PROCEDURE — 3074F PR MOST RECENT SYSTOLIC BLOOD PRESSURE < 130 MM HG: ICD-10-PCS | Mod: CPTII,S$GLB,, | Performed by: STUDENT IN AN ORGANIZED HEALTH CARE EDUCATION/TRAINING PROGRAM

## 2022-06-09 PROCEDURE — 99999 PR PBB SHADOW E&M-EST. PATIENT-LVL IV: CPT | Mod: PBBFAC,,, | Performed by: STUDENT IN AN ORGANIZED HEALTH CARE EDUCATION/TRAINING PROGRAM

## 2022-06-09 PROCEDURE — 86803 HEPATITIS C AB TEST: CPT | Performed by: STUDENT IN AN ORGANIZED HEALTH CARE EDUCATION/TRAINING PROGRAM

## 2022-06-09 PROCEDURE — 3079F PR MOST RECENT DIASTOLIC BLOOD PRESSURE 80-89 MM HG: ICD-10-PCS | Mod: CPTII,S$GLB,, | Performed by: STUDENT IN AN ORGANIZED HEALTH CARE EDUCATION/TRAINING PROGRAM

## 2022-06-09 PROCEDURE — 36415 COLL VENOUS BLD VENIPUNCTURE: CPT | Performed by: STUDENT IN AN ORGANIZED HEALTH CARE EDUCATION/TRAINING PROGRAM

## 2022-06-09 PROCEDURE — 3079F DIAST BP 80-89 MM HG: CPT | Mod: CPTII,S$GLB,, | Performed by: STUDENT IN AN ORGANIZED HEALTH CARE EDUCATION/TRAINING PROGRAM

## 2022-06-09 PROCEDURE — 80061 LIPID PANEL: CPT | Performed by: STUDENT IN AN ORGANIZED HEALTH CARE EDUCATION/TRAINING PROGRAM

## 2022-06-09 PROCEDURE — 1160F PR REVIEW ALL MEDS BY PRESCRIBER/CLIN PHARMACIST DOCUMENTED: ICD-10-PCS | Mod: CPTII,S$GLB,, | Performed by: STUDENT IN AN ORGANIZED HEALTH CARE EDUCATION/TRAINING PROGRAM

## 2022-06-09 PROCEDURE — 1159F PR MEDICATION LIST DOCUMENTED IN MEDICAL RECORD: ICD-10-PCS | Mod: CPTII,S$GLB,, | Performed by: STUDENT IN AN ORGANIZED HEALTH CARE EDUCATION/TRAINING PROGRAM

## 2022-06-09 PROCEDURE — 80053 COMPREHEN METABOLIC PANEL: CPT | Performed by: STUDENT IN AN ORGANIZED HEALTH CARE EDUCATION/TRAINING PROGRAM

## 2022-06-09 PROCEDURE — 3044F HG A1C LEVEL LT 7.0%: CPT | Mod: CPTII,S$GLB,, | Performed by: STUDENT IN AN ORGANIZED HEALTH CARE EDUCATION/TRAINING PROGRAM

## 2022-06-09 PROCEDURE — 3074F SYST BP LT 130 MM HG: CPT | Mod: CPTII,S$GLB,, | Performed by: STUDENT IN AN ORGANIZED HEALTH CARE EDUCATION/TRAINING PROGRAM

## 2022-06-09 PROCEDURE — 84443 ASSAY THYROID STIM HORMONE: CPT | Performed by: STUDENT IN AN ORGANIZED HEALTH CARE EDUCATION/TRAINING PROGRAM

## 2022-06-09 PROCEDURE — 3008F BODY MASS INDEX DOCD: CPT | Mod: CPTII,S$GLB,, | Performed by: STUDENT IN AN ORGANIZED HEALTH CARE EDUCATION/TRAINING PROGRAM

## 2022-06-09 PROCEDURE — 99999 PR PBB SHADOW E&M-EST. PATIENT-LVL IV: ICD-10-PCS | Mod: PBBFAC,,, | Performed by: STUDENT IN AN ORGANIZED HEALTH CARE EDUCATION/TRAINING PROGRAM

## 2022-06-09 PROCEDURE — 3008F PR BODY MASS INDEX (BMI) DOCUMENTED: ICD-10-PCS | Mod: CPTII,S$GLB,, | Performed by: STUDENT IN AN ORGANIZED HEALTH CARE EDUCATION/TRAINING PROGRAM

## 2022-06-09 PROCEDURE — 1159F MED LIST DOCD IN RCRD: CPT | Mod: CPTII,S$GLB,, | Performed by: STUDENT IN AN ORGANIZED HEALTH CARE EDUCATION/TRAINING PROGRAM

## 2022-06-09 RX ORDER — IBUPROFEN 200 MG
1 TABLET ORAL DAILY
Qty: 60 PATCH | Refills: 0 | Status: SHIPPED | OUTPATIENT
Start: 2022-06-09 | End: 2022-08-08

## 2022-06-09 RX ORDER — HYDROGEN PEROXIDE 3 %
20 SOLUTION, NON-ORAL MISCELLANEOUS
Qty: 30 CAPSULE | Refills: 0 | Status: SHIPPED | OUTPATIENT
Start: 2022-06-09 | End: 2023-06-09

## 2022-06-09 NOTE — PROGRESS NOTES
Subjective:       Patient ID: Raymundo Abebe is a 41 y.o. male.    Chief Complaint: Health maintenance examination [Z00.00]    Patient is new to me, here to establish care.    Health maintenance -   Denies family history of colorectal cancer.   Denies significant family history of cardiac disease.   Denies family history of prostate cancer.  UTD on COVID19 primary and booster vaccinations.  Due for Tdap vaccinations.  Started smoking in mid 20's, at most 1 PPD. Currently smoking 1 PPD.  Drinks alcohol 3-4 times weekly, 1-2 drinks per sitting.  Denies drug use.  Currently sexually active with female partner.  Due for HIV and Hep C screening.  Due for lipid screening.  Due for diabetes screening.   at the [employer]  Works out 1-2 times per week.   Mainly weight training.   Endorses healthful diet, plenty fresh fruits and vegetables.  Eats lean proteins, cold water fish.    Persistent CP for the past few years   Mid sternal CP, pulling type pain  Occurs once every 1-2 months  Lasts for about 1-2 minutes once occurs  Associated with palpitations, SOB  Had stress test in 2018, was told normal  Saw cardiologist at that time as well and was told echo normal    Review of Systems   Constitutional: Negative for appetite change, chills, fatigue, fever and unexpected weight change.   Respiratory: Negative for cough and shortness of breath.    Cardiovascular: Positive for chest pain. Negative for palpitations and leg swelling.   Gastrointestinal: Negative for abdominal pain, constipation, diarrhea, nausea and vomiting.   Genitourinary: Negative for difficulty urinating and frequency.   Skin: Negative for rash.   Neurological: Negative for dizziness, syncope, weakness and headaches.         Current Outpatient Medications   Medication Instructions    ibuprofen (ADVIL,MOTRIN) 600 mg, Oral, Every 6 hours PRN     Objective:      Vitals:    06/09/22 1058   BP: 128/84   Pulse: 90   SpO2: 99%   Weight: 82.8 kg (182 lb 8.7  oz)   PainSc: 0-No pain     Body mass index is 25.46 kg/m².    Physical Exam  Vitals reviewed.   Constitutional:       General: He is not in acute distress.     Appearance: Normal appearance. He is not ill-appearing or diaphoretic.   HENT:      Head: Normocephalic and atraumatic.      Right Ear: Tympanic membrane, ear canal and external ear normal. There is no impacted cerumen.      Left Ear: Tympanic membrane, ear canal and external ear normal. There is no impacted cerumen.      Nose: Nose normal. No rhinorrhea.      Mouth/Throat:      Mouth: Mucous membranes are moist.      Pharynx: Oropharynx is clear. No oropharyngeal exudate or posterior oropharyngeal erythema.   Eyes:      General: No scleral icterus.        Right eye: No discharge.         Left eye: No discharge.      Conjunctiva/sclera: Conjunctivae normal.   Neck:      Thyroid: No thyromegaly or thyroid tenderness.      Trachea: Trachea normal.   Cardiovascular:      Rate and Rhythm: Normal rate and regular rhythm.      Heart sounds: Normal heart sounds. No murmur heard.    No friction rub. No gallop.   Pulmonary:      Effort: Pulmonary effort is normal. No respiratory distress.      Breath sounds: Normal breath sounds. No stridor. No wheezing, rhonchi or rales.   Chest:   Breasts:      Right: No supraclavicular adenopathy.      Left: No supraclavicular adenopathy.       Abdominal:      General: Bowel sounds are normal. There is no distension.      Palpations: Abdomen is soft.      Tenderness: There is no abdominal tenderness. There is no guarding or rebound.   Musculoskeletal:         General: No swelling or deformity.      Cervical back: Neck supple.   Lymphadenopathy:      Head:      Right side of head: No submandibular or posterior auricular adenopathy.      Left side of head: No submandibular or posterior auricular adenopathy.      Cervical: No cervical adenopathy.      Right cervical: No superficial, deep or posterior cervical adenopathy.     Left  cervical: No superficial, deep or posterior cervical adenopathy.      Upper Body:      Right upper body: No supraclavicular adenopathy.      Left upper body: No supraclavicular adenopathy.   Skin:     General: Skin is warm and dry.   Neurological:      General: No focal deficit present.      Mental Status: He is alert. Mental status is at baseline.      Gait: Gait normal.   Psychiatric:         Mood and Affect: Mood normal.         Behavior: Behavior normal.         Assessment:       1. Recurrent chest pain    2. Health maintenance examination    3. Need for vaccination    4. Screening for cardiovascular condition    5. Screening for diabetes mellitus    6. Encounter for hepatitis C screening test for low risk patient    7. Screening for HIV without presence of risk factors    8. Cigarette nicotine dependence without complication        Plan:       Recurrent chest pain  -     CBC Auto Differential; Future  -     Comprehensive Metabolic Panel; Future  -     TSH; Future  -     Echo Saline Bubble? No; Future  -     Ambulatory referral/consult to Sports Medicine; Future  -     esomeprazole (NEXIUM) 20 MG capsule; Take 1 capsule (20 mg total) by mouth before breakfast.  -     SCHEDULED EKG 12-LEAD (to [place]); Future    Health maintenance examination  Need for vaccination  Provided written Rx for Tdap vaccination, advised to obtain at [facility] pharmacy on 2nd floor.  RTC in 1 year for annual appointment, sooner PRN.    Screening for cardiovascular condition  -     Lipid Panel; Future    Screening for diabetes mellitus  -     Hemoglobin A1C; Future    Encounter for hepatitis C screening test for low risk patient  -     Hepatitis C Antibody; Future    Screening for HIV without presence of risk factors  -     HIV 1/2 Ag/Ab (4th Gen); Future    Cigarette nicotine dependence without complication  -     Ambulatory referral/consult to Smoking Cessation Program; Future  -     nicotine (NICODERM CQ) 21 mg/24 hr; Place 1 patch onto  the skin once daily.      Kinza Odonnell MD  6/9/2022

## 2022-06-10 ENCOUNTER — TELEPHONE (OUTPATIENT)
Dept: INTERNAL MEDICINE | Facility: CLINIC | Age: 42
End: 2022-06-10
Payer: COMMERCIAL

## 2022-06-10 DIAGNOSIS — R79.89 ELEVATED LFTS: Primary | ICD-10-CM

## 2022-06-10 DIAGNOSIS — E78.5 HYPERLIPIDEMIA, UNSPECIFIED HYPERLIPIDEMIA TYPE: ICD-10-CM

## 2022-06-10 LAB
HCV AB SERPL QL IA: NEGATIVE
HIV 1+2 AB+HIV1 P24 AG SERPL QL IA: NEGATIVE

## 2022-06-10 NOTE — TELEPHONE ENCOUNTER
Called patient, no answer, left voicemail requesting call back.   Elevated LFT's, needs follow up testing. Recommend hydrating well, avoiding heavy exercise, and avoiding alcohol for 24 hours before having the labs drawn.   Cholesterol significantly elevated.

## 2022-06-10 NOTE — TELEPHONE ENCOUNTER
"Called pt and relayed Provider message: " Called patient, no answer, left voicemail requesting call back.   Elevated LFT's, needs follow up testing. Recommend hydrating well, avoiding heavy exercise, and avoiding alcohol for 24 hours before having the labs drawn.   Cholesterol significantly elevated."    Scheduled blood work for Monday.    Pt verbalized understanding and had no further questions/all questions answered.    "

## 2022-06-10 NOTE — TELEPHONE ENCOUNTER
----- Message from Jonathan Wilson sent at 6/10/2022  4:00 PM CDT -----  Regarding: Return Call  Contact: ANEESH GOMEZ [[id number]]  Type:  Patient Returning Call    Who Called: ANEESH GOMEZ [[id number]]    Who Left Message for Patient: Dr. Nascimento    Does the patient know what this is regarding?: yes    Would the patient rather a call back or a response via My [facility]? call    Best Call Back Number: [phone]    Additional Information:

## 2022-06-13 ENCOUNTER — LAB VISIT (OUTPATIENT)
Dept: LAB | Facility: OTHER | Age: 42
End: 2022-06-13
Attending: STUDENT IN AN ORGANIZED HEALTH CARE EDUCATION/TRAINING PROGRAM
Payer: COMMERCIAL

## 2022-06-13 DIAGNOSIS — R79.89 ELEVATED LFTS: ICD-10-CM

## 2022-06-13 LAB
ALBUMIN SERPL BCP-MCNC: 4.3 G/DL (ref 3.5–5.2)
ALP SERPL-CCNC: 61 U/L (ref 55–135)
ALT SERPL W/O P-5'-P-CCNC: 38 U/L (ref 10–44)
ANION GAP SERPL CALC-SCNC: 15 MMOL/L (ref 8–16)
AST SERPL-CCNC: 21 U/L (ref 10–40)
BILIRUB SERPL-MCNC: 0.2 MG/DL (ref 0.1–1)
BUN SERPL-MCNC: 12 MG/DL (ref 6–20)
CALCIUM SERPL-MCNC: 9.7 MG/DL (ref 8.7–10.5)
CHLORIDE SERPL-SCNC: 105 MMOL/L (ref 95–110)
CO2 SERPL-SCNC: 20 MMOL/L (ref 23–29)
CREAT SERPL-MCNC: 0.8 MG/DL (ref 0.5–1.4)
EST. GFR  (AFRICAN AMERICAN): >60 ML/MIN/1.73 M^2
EST. GFR  (NON AFRICAN AMERICAN): >60 ML/MIN/1.73 M^2
FERRITIN SERPL-MCNC: 115 NG/ML (ref 20–300)
GLUCOSE SERPL-MCNC: 103 MG/DL (ref 70–110)
POTASSIUM SERPL-SCNC: 3.8 MMOL/L (ref 3.5–5.1)
PROT SERPL-MCNC: 7.4 G/DL (ref 6–8.4)
SODIUM SERPL-SCNC: 140 MMOL/L (ref 136–145)

## 2022-06-13 PROCEDURE — 36415 COLL VENOUS BLD VENIPUNCTURE: CPT | Performed by: STUDENT IN AN ORGANIZED HEALTH CARE EDUCATION/TRAINING PROGRAM

## 2022-06-13 PROCEDURE — 82525 ASSAY OF COPPER: CPT | Performed by: STUDENT IN AN ORGANIZED HEALTH CARE EDUCATION/TRAINING PROGRAM

## 2022-06-13 PROCEDURE — 82728 ASSAY OF FERRITIN: CPT | Performed by: STUDENT IN AN ORGANIZED HEALTH CARE EDUCATION/TRAINING PROGRAM

## 2022-06-13 PROCEDURE — 80053 COMPREHEN METABOLIC PANEL: CPT | Performed by: STUDENT IN AN ORGANIZED HEALTH CARE EDUCATION/TRAINING PROGRAM

## 2022-06-13 PROCEDURE — 80074 ACUTE HEPATITIS PANEL: CPT | Performed by: STUDENT IN AN ORGANIZED HEALTH CARE EDUCATION/TRAINING PROGRAM

## 2022-06-14 ENCOUNTER — PATIENT MESSAGE (OUTPATIENT)
Dept: INTERNAL MEDICINE | Facility: CLINIC | Age: 42
End: 2022-06-14
Payer: COMMERCIAL

## 2022-06-14 LAB
HAV IGM SERPL QL IA: NEGATIVE
HBV CORE IGM SERPL QL IA: NEGATIVE
HBV SURFACE AG SERPL QL IA: NEGATIVE
HCV AB SERPL QL IA: NEGATIVE

## 2022-06-16 ENCOUNTER — TELEPHONE (OUTPATIENT)
Dept: INTERNAL MEDICINE | Facility: CLINIC | Age: 42
End: 2022-06-16
Payer: COMMERCIAL

## 2022-06-16 LAB — COPPER SERPL-MCNC: 1101 UG/L (ref 665–1480)

## 2022-06-16 NOTE — TELEPHONE ENCOUNTER
----- Message from Luís Olson sent at 6/16/2022  8:48 AM CDT -----  Regarding: EKG order  Good morning all,     A ekg was performed on 6/9/22 however there is no order. At your earliest convenience may I get a ekg order for proper billing. Thank you

## 2022-06-20 ENCOUNTER — PATIENT MESSAGE (OUTPATIENT)
Dept: INTERNAL MEDICINE | Facility: CLINIC | Age: 42
End: 2022-06-20
Payer: COMMERCIAL

## 2024-04-22 ENCOUNTER — OFFICE VISIT (OUTPATIENT)
Dept: INTERNAL MEDICINE | Facility: CLINIC | Age: 44
End: 2024-04-22
Payer: COMMERCIAL

## 2024-04-22 VITALS
BODY MASS INDEX: 25.58 KG/M2 | WEIGHT: 183.44 LBS | HEART RATE: 80 BPM | OXYGEN SATURATION: 98 % | DIASTOLIC BLOOD PRESSURE: 86 MMHG | SYSTOLIC BLOOD PRESSURE: 142 MMHG

## 2024-04-22 DIAGNOSIS — Z13.1 SCREENING FOR DIABETES MELLITUS: ICD-10-CM

## 2024-04-22 DIAGNOSIS — E55.9 VITAMIN D DEFICIENCY: ICD-10-CM

## 2024-04-22 DIAGNOSIS — Z00.00 HEALTH MAINTENANCE EXAMINATION: Primary | ICD-10-CM

## 2024-04-22 DIAGNOSIS — Z23 NEED FOR VACCINATION: ICD-10-CM

## 2024-04-22 DIAGNOSIS — E78.5 HYPERLIPIDEMIA, UNSPECIFIED HYPERLIPIDEMIA TYPE: ICD-10-CM

## 2024-04-22 DIAGNOSIS — R07.9 RECURRENT CHEST PAIN: ICD-10-CM

## 2024-04-22 PROCEDURE — 3077F SYST BP >= 140 MM HG: CPT | Mod: CPTII,S$GLB,, | Performed by: STUDENT IN AN ORGANIZED HEALTH CARE EDUCATION/TRAINING PROGRAM

## 2024-04-22 PROCEDURE — 90471 IMMUNIZATION ADMIN: CPT | Mod: S$GLB,,, | Performed by: STUDENT IN AN ORGANIZED HEALTH CARE EDUCATION/TRAINING PROGRAM

## 2024-04-22 PROCEDURE — 99999 PR PBB SHADOW E&M-EST. PATIENT-LVL III: CPT | Mod: PBBFAC,,, | Performed by: STUDENT IN AN ORGANIZED HEALTH CARE EDUCATION/TRAINING PROGRAM

## 2024-04-22 PROCEDURE — 90715 TDAP VACCINE 7 YRS/> IM: CPT | Mod: S$GLB,,, | Performed by: STUDENT IN AN ORGANIZED HEALTH CARE EDUCATION/TRAINING PROGRAM

## 2024-04-22 PROCEDURE — 1159F MED LIST DOCD IN RCRD: CPT | Mod: CPTII,S$GLB,, | Performed by: STUDENT IN AN ORGANIZED HEALTH CARE EDUCATION/TRAINING PROGRAM

## 2024-04-22 PROCEDURE — 3079F DIAST BP 80-89 MM HG: CPT | Mod: CPTII,S$GLB,, | Performed by: STUDENT IN AN ORGANIZED HEALTH CARE EDUCATION/TRAINING PROGRAM

## 2024-04-22 PROCEDURE — 3008F BODY MASS INDEX DOCD: CPT | Mod: CPTII,S$GLB,, | Performed by: STUDENT IN AN ORGANIZED HEALTH CARE EDUCATION/TRAINING PROGRAM

## 2024-04-22 PROCEDURE — 99213 OFFICE O/P EST LOW 20 MIN: CPT | Mod: 25,S$GLB,, | Performed by: STUDENT IN AN ORGANIZED HEALTH CARE EDUCATION/TRAINING PROGRAM

## 2024-04-22 PROCEDURE — 99396 PREV VISIT EST AGE 40-64: CPT | Mod: 25,S$GLB,, | Performed by: STUDENT IN AN ORGANIZED HEALTH CARE EDUCATION/TRAINING PROGRAM

## 2024-04-22 NOTE — PROGRESS NOTES
"Patient was given vaccine information sheet for the Tdap immunization. The area of injection was palpated using the acromion process as a landmark. This area was cleaned with alcohol. Using a 25g 1" safety needle, 0.5mL of the vaccine was placed into the left deltoid muscle. The injection site was dressed with a bandage. Patient experienced no complications and was discharged in stable condition. Tdap Lot: [id number] Exp: 01/19/26     "

## 2024-04-22 NOTE — PROGRESS NOTES
"Subjective:       Patient ID: Raymundo Abebe is a 43 y.o. male.    Chief Complaint: Health maintenance examination [Z00.00]    Patient is established with me, here today for the following:    Health maintenance -   Denies family history of colorectal cancer.   Denies significant family history of cardiac disease.   Denies family history of prostate cancer.  UTD on COVID primary/booster vaccinations.  Due for Tdap , COVID vaccinations.  Started smoking in mid 20's, at most 1 PPD. Currently smoking 4-5 cigarettes daily  Drinks alcohol 2-3 times weekly, 1-2 drinks per sitting.  Denies drug use.  Completed HIV and hepatitis C screening.  Due for diabetes screening.  Lab Results       Component                Value               Date                       HGBA1C                   5.4                 06/09/2022               Chest pain worsening since last appointment  Has not yet completed echo or EKG  Called 911 due to symptoms, but declined transport to the hospital  Now occurring almost weekly  Will last for 30-45 minutes  No specific triggers  Never wakes from sleep   Feels pain radiates in left shoulder  Associated with SOB, palpitations  Will feel light headed and fatigued    Per prior note:  "Persistent CP for the past few years   Mid sternal CP, pulling type pain  Occurs once every 1-2 months  Lasts for about 1-2 minutes once occurs  Associated with palpitations, SOB  Had stress test in 2018, was told normal  Saw cardiologist at that time as well and was told echo normal"     HLD -   Lab Results       Component                Value               Date                       CHOL                     244 (H)             06/09/2022            Lab Results       Component                Value               Date                       TRIG                     145                 06/09/2022            Lab Results       Component                Value               Date                       LDLCALC                  164.0 " (H)           06/09/2022            Lab Results       Component                Value               Date                       HDL                      51                  06/09/2022            The 10-year ASCVD risk score (Brandon FLAHERTY, et al., 2019) is: 5.9%        Review of Systems   Constitutional:  Negative for appetite change, chills, fatigue, fever and unexpected weight change.   Respiratory:  Positive for shortness of breath. Negative for cough.    Cardiovascular:  Positive for chest pain and palpitations. Negative for leg swelling.   Gastrointestinal:  Negative for abdominal pain, constipation, diarrhea, nausea and vomiting.   Genitourinary:  Negative for difficulty urinating and frequency.   Skin:  Negative for rash.   Neurological:  Positive for dizziness and light-headedness. Negative for syncope, weakness and headaches.         Current Outpatient Medications   Medication Instructions    ibuprofen (ADVIL,MOTRIN) 600 mg, Oral, Every 6 hours PRN     Objective:      Vitals:    04/22/24 1528   BP: (!) 142/86   Pulse: 80   SpO2: 98%   Weight: 83.2 kg (183 lb 6.8 oz)   PainSc: 0-No pain     Body mass index is 25.58 kg/m².    Physical Exam  Vitals reviewed.   Constitutional:       General: He is not in acute distress.     Appearance: Normal appearance. He is not ill-appearing or diaphoretic.   HENT:      Head: Normocephalic and atraumatic.      Right Ear: Tympanic membrane, ear canal and external ear normal. There is no impacted cerumen.      Left Ear: Tympanic membrane, ear canal and external ear normal. There is no impacted cerumen.      Nose: Nose normal. No rhinorrhea.      Mouth/Throat:      Mouth: Mucous membranes are moist.      Pharynx: Oropharynx is clear. No oropharyngeal exudate or posterior oropharyngeal erythema.   Eyes:      General: No scleral icterus.        Right eye: No discharge.         Left eye: No discharge.      Conjunctiva/sclera: Conjunctivae normal.   Neck:      Thyroid: No thyromegaly or  thyroid tenderness.      Trachea: Trachea normal.   Cardiovascular:      Rate and Rhythm: Normal rate and regular rhythm.      Heart sounds: Normal heart sounds. No murmur heard.     No friction rub. No gallop.   Pulmonary:      Effort: Pulmonary effort is normal. No respiratory distress.      Breath sounds: Normal breath sounds. No stridor. No wheezing, rhonchi or rales.   Abdominal:      General: Bowel sounds are normal. There is no distension.      Palpations: Abdomen is soft.      Tenderness: There is no abdominal tenderness. There is no guarding or rebound.   Musculoskeletal:         General: No swelling or deformity.      Cervical back: Neck supple.   Lymphadenopathy:      Head:      Right side of head: No submandibular or posterior auricular adenopathy.      Left side of head: No submandibular or posterior auricular adenopathy.      Cervical: No cervical adenopathy.      Right cervical: No superficial, deep or posterior cervical adenopathy.     Left cervical: No superficial, deep or posterior cervical adenopathy.      Upper Body:      Right upper body: No supraclavicular adenopathy.      Left upper body: No supraclavicular adenopathy.   Skin:     General: Skin is warm and dry.   Neurological:      General: No focal deficit present.      Mental Status: He is alert. Mental status is at baseline.      Gait: Gait normal.   Psychiatric:         Mood and Affect: Mood normal.         Behavior: Behavior normal.         Assessment:       1. Health maintenance examination    2. Recurrent chest pain    3. Hyperlipidemia, unspecified hyperlipidemia type    4. Need for vaccination    5. Screening for diabetes mellitus    6. Vitamin D deficiency        Plan:       Recurrent chest pain  EKG, echo, and holter monitor  Follow up with cardiologist  RTC in 3 months for follow up.  -     EKG 12-lead; Future  -     Echo; Future  -     Holter monitor - 48 hour; Future  -     Ambulatory referral/consult to Cardiology; Future  -      Comprehensive Metabolic Panel; Future  -     TSH; Future  -     CBC Auto Differential; Future    Hyperlipidemia, unspecified hyperlipidemia type  -     Lipid Panel; Future    Health maintenance examination  Reviewed and discussed age appropriate screenings and immunizations.  -     Comprehensive Metabolic Panel; Future  -     TSH; Future  -     Lipid Panel; Future  -     Hemoglobin A1C; Future  -     CBC Auto Differential; Future  -     Vitamin D; Future    Need for vaccination  -     Tdap (BOOSTRIX) vaccine injection 0.5 mL    Screening for diabetes mellitus  -     Hemoglobin A1C; Future    Vitamin D deficiency  -     Vitamin D; Future      Kinza Odonnell MD  4/22/2024

## 2024-04-23 ENCOUNTER — HOSPITAL ENCOUNTER (OUTPATIENT)
Dept: CARDIOLOGY | Facility: OTHER | Age: 44
Discharge: HOME | End: 2024-04-23
Attending: STUDENT IN AN ORGANIZED HEALTH CARE EDUCATION/TRAINING PROGRAM
Payer: COMMERCIAL

## 2024-04-23 DIAGNOSIS — R07.9 RECURRENT CHEST PAIN: ICD-10-CM

## 2024-04-23 LAB
OHS QRS DURATION: 96 MS
OHS QTC CALCULATION: 417 MS

## 2024-04-23 PROCEDURE — 93005 ELECTROCARDIOGRAM TRACING: CPT

## 2024-04-23 PROCEDURE — 93010 ELECTROCARDIOGRAM REPORT: CPT | Mod: ,,, | Performed by: INTERNAL MEDICINE

## 2024-04-26 ENCOUNTER — TELEPHONE (OUTPATIENT)
Dept: INTERNAL MEDICINE | Facility: CLINIC | Age: 44
End: 2024-04-26
Payer: COMMERCIAL

## 2024-04-26 ENCOUNTER — TELEPHONE (OUTPATIENT)
Dept: INTERNAL MEDICINE | Facility: CLINIC | Age: 44
End: 2024-04-26

## 2024-04-26 ENCOUNTER — HOSPITAL ENCOUNTER (OUTPATIENT)
Dept: CARDIOLOGY | Facility: OTHER | Age: 44
Discharge: HOME | End: 2024-04-26
Attending: STUDENT IN AN ORGANIZED HEALTH CARE EDUCATION/TRAINING PROGRAM
Payer: COMMERCIAL

## 2024-04-26 ENCOUNTER — OFFICE VISIT (OUTPATIENT)
Dept: INTERNAL MEDICINE | Facility: CLINIC | Age: 44
End: 2024-04-26
Payer: COMMERCIAL

## 2024-04-26 VITALS
WEIGHT: 183 LBS | SYSTOLIC BLOOD PRESSURE: 142 MMHG | HEART RATE: 80 BPM | BODY MASS INDEX: 25.62 KG/M2 | HEIGHT: 71 IN | DIASTOLIC BLOOD PRESSURE: 86 MMHG

## 2024-04-26 DIAGNOSIS — M79.10 MYALGIA: ICD-10-CM

## 2024-04-26 DIAGNOSIS — F17.210 LIGHT CIGARETTE SMOKER (1-9 CIGS/DAY): ICD-10-CM

## 2024-04-26 DIAGNOSIS — R07.9 RECURRENT CHEST PAIN: ICD-10-CM

## 2024-04-26 DIAGNOSIS — R03.0 ELEVATED BLOOD PRESSURE READING WITHOUT DIAGNOSIS OF HYPERTENSION: ICD-10-CM

## 2024-04-26 DIAGNOSIS — R00.2 PALPITATIONS: Primary | ICD-10-CM

## 2024-04-26 PROCEDURE — 93356 MYOCRD STRAIN IMG SPCKL TRCK: CPT | Mod: ,,, | Performed by: INTERNAL MEDICINE

## 2024-04-26 PROCEDURE — 99417 PROLNG OP E/M EACH 15 MIN: CPT | Mod: 95,,, | Performed by: INTERNAL MEDICINE

## 2024-04-26 PROCEDURE — 99215 OFFICE O/P EST HI 40 MIN: CPT | Mod: 95,,, | Performed by: INTERNAL MEDICINE

## 2024-04-26 PROCEDURE — 1160F RVW MEDS BY RX/DR IN RCRD: CPT | Mod: CPTII,95,, | Performed by: INTERNAL MEDICINE

## 2024-04-26 PROCEDURE — G2211 COMPLEX E/M VISIT ADD ON: HCPCS | Mod: 95,,, | Performed by: INTERNAL MEDICINE

## 2024-04-26 PROCEDURE — 93356 MYOCRD STRAIN IMG SPCKL TRCK: CPT

## 2024-04-26 PROCEDURE — 3044F HG A1C LEVEL LT 7.0%: CPT | Mod: CPTII,95,, | Performed by: INTERNAL MEDICINE

## 2024-04-26 PROCEDURE — 1159F MED LIST DOCD IN RCRD: CPT | Mod: CPTII,95,, | Performed by: INTERNAL MEDICINE

## 2024-04-26 PROCEDURE — 93306 TTE W/DOPPLER COMPLETE: CPT | Mod: 26,,, | Performed by: INTERNAL MEDICINE

## 2024-04-26 NOTE — TELEPHONE ENCOUNTER
----- Message from Karuna Obrien sent at 4/26/2024 12:30 PM CDT -----      Name of Who is Calling: ANEESH GOMEZ [[id number]]      What is the request in detail: Pt called said he feeling dizzy and wanted to be seen again.Please contact to further discuss and advise.          Can the clinic reply by [facility]: Y      What Number to Call Back if not in [facility]: [phone]

## 2024-04-26 NOTE — TELEPHONE ENCOUNTER
----- Message from Eleanor See MD sent at 4/26/2024  2:54 PM CDT -----  1. Please schedule a visit in 3 weeks for palpitations, BP.  Virtual with me.  2. Schedule labs anytime.  He would like to do this on Monday.  Not fasting.  3.  Schedule stress test.  Thank you!

## 2024-04-26 NOTE — PATIENT INSTRUCTIONS
[email or web address]       All of your core healthy metrics are met.      Aristeo Fonseca,     If you are due for any health screening(s) below please notify me so we can arrange them to be ordered and scheduled to maintain your health. Most healthy patients complete it. Don't lose out on improving your health.     All of your core healthy metrics are met.

## 2024-04-26 NOTE — TELEPHONE ENCOUNTER
----- Message from Karuna Obrien sent at 4/26/2024 12:30 PM CDT -----      Name of Who is Calling: ANEESH GOMEZ [[id number]]      What is the request in detail: Pt called said he feeling dizzy and wanted to be seen again.Please contact to further discuss and advise.          Can the clinic reply by [facility]: Y      What Number to Call Back if not in [facility]: [phone]

## 2024-04-26 NOTE — PROGRESS NOTES
"The patient location is: [place]  The chief complaint leading to consultation is: Dizziness    Visit type: Virtual visit with synchronous audio and video  Contact time spent with patient: 33 minutes  Each patient to whom he or she provides medical services by telemedicine is:  (1) informed of the relationship between the physician and patient and the respective role of any other health care provider with respect to management of the patient; and (2) notified that he or she may decline to receive medical services by telemedicine and may withdraw from such care at any time.    Subjective:       Patient ID: Raymundo Abebe is a 43 y.o. male who  has a past medical history of Hyperlipidemia (6/10/2022).    Chief Complaint: Dizziness     History was obtained from the patient and supplemented through chart review.  He is a patient of Kinza Odonnell MD.  Was seen earlier this week for dizziness.    Works as a  in a restaurant.    HPI    CP:  He has had chronic chest pain for years.  Reportedly has stress test in 2018 was normal.  He saw an [facility] cardiologist at the time and was told that his TTE was normal.    He discussed this with his PCP in 2022.  Prescribed Nexium.  He did not complete EKG or echo at the time.      He saw his PCP earlier this week.  Chest pain had worsened.  He had a severe episode of this in July, prompting him to call 911.  However, he declined transport to the hospital.  Will usually have episodes of CP, palpitations associated with clamminess and sweats.  Previously occurring once every 1-2 months.  Now occurring almost monthly.  Previously lasting for 1-2 minutes.  Now lasting for 30-45 minutes.  No specific triggers. Can occur at rest, after work.  Radiates to his left shoulder.    Associated with SOB, palpitations.  Will feel lightheaded, fatigued.    Now also begins as a "twitch" in his legs.  His bilateral hamstrings and calves feel tight, somewhat achy, "tingly".  Feels like whole body " ""dehydration" , but he stays well hydrated.  Usually helps when he drinks water.  Hasn't had this leg sensation before other than with vigorous exercise.  He works as a  and is on his feet at work.  He plays/runs with the kids, but is otherwise not exercising. No heavy lifting recently.  No hematuria. Normal UOP.  No leg edema.  Flew to [place] recently; plane ride was 2 hrs at time. No personal or family h/o clotting disorders.      This occurred last night, but not as severe as before.  Gradual onset b/l leg tightness while he was driving home from work.  Followed by gradual onset mid/left-sided chest pain when he was at home.  Radiation down L arm, neck.  He sat in bed, drank water, ate bananas. Gradually resolved.    He feels slightly better today, but about the same. Had breakfast, drank water today.  Overall feeling of malaise, lightheadeness, global weakness, lethargic.  No HA.    Not taking any meds, supplements.  Denies illicit drug use.  +THC gummy.  No cocaine use.    +FHx HTN.  Does not have a family history of early cardiac disease.    No history of adrenal imaging in our records.    Cardiac exam during visit earlier this week was normal.  No murmurs.  Lungs CTAB.    CBC, CMP, TSH, A1c wnl.  Total cholesterol just borderline high, improved.  PCP also ordered TTE, Holter.  TTE pending.   Has appointment with cardiologist in June.    Elevated BP:  BP has been borderline high since 2017.  Had reportedly improved to 130s on repeat check in clinic.    Home BP:  No cuff    BP Readings from Last 3 Encounters:   04/26/24 (!) 142/86   04/22/24 (!) 142/86   06/09/22 128/84     Tobacco use:    Started smoking in mid 20's, at most 1 PPD. Currently smoking 4-5 cigarettes daily     Review of Systems   Constitutional:  Negative for activity change and unexpected weight change.   HENT:  Negative for hearing loss, rhinorrhea and trouble swallowing.    Eyes:  Positive for discharge. Negative for visual disturbance. "   Respiratory:  Positive for chest tightness. Negative for wheezing.    Cardiovascular:  Positive for palpitations. Negative for chest pain.   Gastrointestinal:  Positive for diarrhea. Negative for blood in stool, constipation and vomiting.   Endocrine: Negative for polydipsia and polyuria.   Genitourinary:  Negative for difficulty urinating, hematuria and urgency.   Musculoskeletal:  Positive for neck pain. Negative for arthralgias and joint swelling.   Neurological:  Positive for weakness. Negative for headaches.   Psychiatric/Behavioral:  Positive for confusion. Negative for dysphoric mood.        Past Medical History:   Diagnosis Date    Hyperlipidemia 6/10/2022     Past Surgical History:   Procedure Laterality Date    FACIAL RECONSTRUCTION SURGERY  1987    WISDOM TOOTH EXTRACTION       Family History   Problem Relation Name Age of Onset    Breast cancer Mother  47    Cancer Maternal Grandfather      Melanoma Paternal Grandfather      Colon cancer Neg Hx      Ovarian cancer Neg Hx      Prostate cancer Neg Hx      Heart attack Neg Hx      Stroke Neg Hx      Diabetes Neg Hx       Social History     Socioeconomic History    Marital status: Single   Tobacco Use    Smoking status: Every Day     Current packs/day: 1.00     Types: Cigarettes    Smokeless tobacco: Current   Substance and Sexual Activity    Alcohol use: Yes     Alcohol/week: 4.0 - 5.0 standard drinks of alcohol     Types: 4 - 5 Cans of beer per week     Comment: twice weekly    Drug use: No    Sexual activity: Yes     Social Determinants of Health     Financial Resource Strain: Low Risk  (4/26/2024)    Overall Financial Resource Strain (CARDIA)     Difficulty of Paying Living Expenses: Not hard at all   Food Insecurity: No Food Insecurity (4/26/2024)    Hunger Vital Sign     Worried About Running Out of Food in the Last Year: Never true     Ran Out of Food in the Last Year: Never true   Transportation Needs: No Transportation Needs (4/26/2024)    PRAPARE  - Transportation     Lack of Transportation (Medical): No     Lack of Transportation (Non-Medical): No   Physical Activity: Sufficiently Active (4/26/2024)    Exercise Vital Sign     Days of Exercise per Week: 7 days     Minutes of Exercise per Session: 30 min   Stress: Stress Concern Present (4/26/2024)    [place] [employer] - Occupational Stress Questionnaire     Feeling of Stress : To some extent   Social Connections: Unknown (4/26/2024)    Social Connection and Isolation Panel [NHANES]     Frequency of Communication with Friends and Family: More than three times a week     Frequency of Social Gatherings with Friends and Family: Once a week     Active Member of Clubs or Organizations: No     Attends Club or Organization Meetings: Never     Marital Status: Living with partner   Housing Stability: Unknown (4/26/2024)    Housing Stability Vital Sign     Unable to Pay for Housing in the Last Year: No     Objective:      There were no vitals filed for this visit.   Physical Exam  Constitutional:       General: He is not in acute distress.     Appearance: He is well-developed. He is not ill-appearing or diaphoretic.   HENT:      Head: Normocephalic.   Eyes:      General: No scleral icterus.        Right eye: No discharge.         Left eye: No discharge.   Pulmonary:      Effort: Pulmonary effort is normal. No respiratory distress.   Skin:     Coloration: Skin is not pale.      Findings: No erythema.   Neurological:      Mental Status: He is alert.   Psychiatric:         Behavior: Behavior normal.           Lab Results   Component Value Date    WBC 12.12 04/23/2024    HGB 15.5 04/23/2024    HCT 47.2 04/23/2024     04/23/2024    CHOL 205 (H) 04/23/2024    TRIG 90 04/23/2024    HDL 42 04/23/2024    ALT 28 04/23/2024    AST 19 04/23/2024     04/23/2024    K 4.3 04/23/2024     04/23/2024    CREATININE 0.9 04/23/2024    BUN 14 04/23/2024    CO2 25 04/23/2024    TSH 1.776 04/23/2024     HGBA1C 5.5 04/23/2024       The 10-year ASCVD risk score (Brandon FLAHERTY, et al., 2019) is: 6.4%    Values used to calculate the score:      Age: 43 years      Sex: Male      Is Non- : No      Diabetic: No      Tobacco smoker: Yes      Systolic Blood Pressure: 142 mmHg      Is BP treated: No      HDL Cholesterol: 42 mg/dL      Total Cholesterol: 205 mg/dL    (Imaging have been independently reviewed)  CXR in 2019 without acute abnormality.    Assessment:       1. Palpitations    2. Elevated blood pressure reading without diagnosis of hypertension    3. Myalgia    4. Light cigarette smoker (1-9 cigs/day)        Plan:       Raymundo was seen today for dizziness.    Diagnoses and all orders for this visit:    Palpitations  Comments:  TTE pending. Holter scheduled. Check stress test. D dimer to r/o VTE; if +, will need CTA and BLE US. If w/u neg, consider eval for pheo. Has appt c Cards.  Orders:  -     Stress Echo Which stress agent will be used? Treadmill Exercise; Color Flow Doppler? No; Future  -     D-DIMER, QUANTITATIVE; Future    Elevated blood pressure reading without diagnosis of hypertension  Comments:  BP has been borderline high in clinic since 2017.  Advised to obtain home BP cuff. Discussed BP technique. RTC c BP log.    Myalgia  Comments:  No dehydration, trigger. CK to eval for rhabdo, D-dimer to r/o VTE. Renal function, TSH, Vit D wnl.  Orders:  -     CK; Future  -     D-DIMER, QUANTITATIVE; Future    Light cigarette smoker (1-9 cigs/day)  Comments:  Risk factor for CAD.  Ordered stress test as above.         Side effects of medication(s) were discussed in detail and patient voiced understanding.  Patient will call back for any issues or complications.     I have spent a total of 75 minutes with the patient as well as reviewing the chart/medical record and placing orders on the day of the visit. Discussed w/u for CP/palpitations, elevated BP.  Updated PCP.    Visit today is associated  with current or anticipated ongoing medical care related to CP, elevated BP.    RTC in 1 month(s) or sooner PRN for CP, elevated BP.  Virtual visit with me.

## 2024-04-26 NOTE — Clinical Note
ADA that I saw him for chest pain, but now preceeded by b/l leg tightness.  The labs that you had ordered were pretty normal.  FLP is better. The TTE and Holter are scheduled.  I ordered D-dimer to r/o VTE due to his leg sx and weird overall feeling/malaise.  I ordered a stress test since he has some mild risk factors for CAD.  His BP has been borderline high in clinic since 2017.  He will buy a home BP cuff and start a BP log.  If all the workup is negative, I am considering a workup for pheo given the episode of palpitations and sweats.  I will follow-up with him in about 3 weeks for BP log and to review his cardiac w/u.

## 2024-04-29 ENCOUNTER — LAB VISIT (OUTPATIENT)
Dept: LAB | Facility: OTHER | Age: 44
End: 2024-04-29
Attending: INTERNAL MEDICINE
Payer: COMMERCIAL

## 2024-04-29 DIAGNOSIS — M79.10 MYALGIA: ICD-10-CM

## 2024-04-29 DIAGNOSIS — R00.2 PALPITATIONS: ICD-10-CM

## 2024-04-29 LAB
ASCENDING AORTA: 3.31 CM
AV INDEX (PROSTH): 1.08
AV MEAN GRADIENT: 4 MMHG
AV PEAK GRADIENT: 6 MMHG
AV VALVE AREA BY VELOCITY RATIO: 4.45 CM²
AV VALVE AREA: 5.03 CM²
AV VELOCITY RATIO: 0.95
BSA FOR ECHO PROCEDURE: 2.04 M2
CK SERPL-CCNC: 114 U/L (ref 20–200)
CV ECHO LV RWT: 0.3 CM
D DIMER PPP IA.FEU-MCNC: 0.34 MG/L FEU
DOP CALC AO PEAK VEL: 1.25 M/S
DOP CALC AO VTI: 22.4 CM
DOP CALC LVOT AREA: 4.7 CM2
DOP CALC LVOT DIAMETER: 2.44 CM
DOP CALC LVOT PEAK VEL: 1.19 M/S
DOP CALC LVOT STROKE VOLUME: 112.63 CM3
DOP CALC RVOT PEAK VEL: 0.92 M/S
DOP CALC RVOT VTI: 17.9 CM
DOP CALCLVOT PEAK VEL VTI: 24.1 CM
E WAVE DECELERATION TIME: 171.56 MSEC
E/A RATIO: 1.14
E/E' RATIO: 4.96 M/S
ECHO LV POSTERIOR WALL: 0.78 CM (ref 0.6–1.1)
EJECTION FRACTION: 65 %
FRACTIONAL SHORTENING: 34 % (ref 28–44)
GLOBAL LONGITUIDAL STRAIN: 19 %
INTERVENTRICULAR SEPTUM: 0.78 CM (ref 0.6–1.1)
IVC DIAMETER: 1.14 CM
IVRT: 106.57 MSEC
LA MAJOR: 5.06 CM
LA MINOR: 5.28 CM
LA WIDTH: 3.6 CM
LEFT ATRIUM SIZE: 3.57 CM
LEFT ATRIUM VOLUME INDEX MOD: 28.7 ML/M2
LEFT ATRIUM VOLUME INDEX: 27.8 ML/M2
LEFT ATRIUM VOLUME MOD: 58.35 CM3
LEFT ATRIUM VOLUME: 56.45 CM3
LEFT INTERNAL DIMENSION IN SYSTOLE: 3.38 CM (ref 2.1–4)
LEFT VENTRICLE DIASTOLIC VOLUME INDEX: 62.03 ML/M2
LEFT VENTRICLE DIASTOLIC VOLUME: 125.92 ML
LEFT VENTRICLE MASS INDEX: 68 G/M2
LEFT VENTRICLE SYSTOLIC VOLUME INDEX: 23.1 ML/M2
LEFT VENTRICLE SYSTOLIC VOLUME: 46.85 ML
LEFT VENTRICULAR INTERNAL DIMENSION IN DIASTOLE: 5.14 CM (ref 3.5–6)
LEFT VENTRICULAR MASS: 137.85 G
LV LATERAL E/E' RATIO: 3.94 M/S
LV SEPTAL E/E' RATIO: 6.7 M/S
LVOT MG: 2.88 MMHG
LVOT MV: 0.8 CM/S
MV PEAK A VEL: 0.59 M/S
MV PEAK E VEL: 0.67 M/S
MV STENOSIS PRESSURE HALF TIME: 49.75 MS
MV VALVE AREA P 1/2 METHOD: 4.42 CM2
OHS CV RV/LV RATIO: 0.56 CM
PISA MRMAX VEL: 3.08 M/S
PISA TR MAX VEL: 2.15 M/S
PULM VEIN S/D RATIO: 1.18
PV MEAN GRADIENT: 2 MMHG
PV PEAK D VEL: 0.45 M/S
PV PEAK GRADIENT: 5 MMHG
PV PEAK S VEL: 0.53 M/S
PV PEAK VELOCITY: 1.16 M/S
RA MAJOR: 4.09 CM
RA PRESSURE ESTIMATED: 3 MMHG
RA WIDTH: 3.9 CM
RIGHT VENTRICULAR END-DIASTOLIC DIMENSION: 2.9 CM
RV TB RVSP: 5 MMHG
RV TISSUE DOPPLER FREE WALL SYSTOLIC VELOCITY 1 (APICAL 4 CHAMBER VIEW): 13.57 CM/S
SINUS: 3.6 CM
STJ: 3.34 CM
TDI LATERAL: 0.17 M/S
TDI SEPTAL: 0.1 M/S
TDI: 0.14 M/S
TR MAX PG: 18 MMHG
TRICUSPID ANNULAR PLANE SYSTOLIC EXCURSION: 2.3 CM
TV REST PULMONARY ARTERY PRESSURE: 21 MMHG
Z-SCORE OF LEFT VENTRICULAR DIMENSION IN END DIASTOLE: -1.57
Z-SCORE OF LEFT VENTRICULAR DIMENSION IN END SYSTOLE: -0.69

## 2024-04-29 PROCEDURE — 36415 COLL VENOUS BLD VENIPUNCTURE: CPT | Performed by: INTERNAL MEDICINE

## 2024-04-29 PROCEDURE — 82550 ASSAY OF CK (CPK): CPT | Performed by: INTERNAL MEDICINE

## 2024-04-29 PROCEDURE — 85379 FIBRIN DEGRADATION QUANT: CPT | Performed by: INTERNAL MEDICINE

## 2024-06-06 ENCOUNTER — TELEPHONE (OUTPATIENT)
Dept: ADMINISTRATIVE | Facility: OTHER | Age: 44
End: 2024-06-06
Payer: COMMERCIAL

## 2024-06-06 NOTE — TELEPHONE ENCOUNTER
LM with rescheduled Cardiology appointment of 7-, and contact number provided for any questions. My Chart message to be sent.

## 2024-06-20 ENCOUNTER — TELEPHONE (OUTPATIENT)
Dept: ADMINISTRATIVE | Facility: OTHER | Age: 44
End: 2024-06-20
Payer: COMMERCIAL

## 2024-07-24 ENCOUNTER — OFFICE VISIT (OUTPATIENT)
Dept: INTERNAL MEDICINE | Facility: CLINIC | Age: 44
End: 2024-07-24
Payer: COMMERCIAL

## 2024-07-24 VITALS
HEART RATE: 83 BPM | BODY MASS INDEX: 24.97 KG/M2 | DIASTOLIC BLOOD PRESSURE: 91 MMHG | OXYGEN SATURATION: 98 % | SYSTOLIC BLOOD PRESSURE: 146 MMHG | WEIGHT: 179 LBS

## 2024-07-24 DIAGNOSIS — R10.9 ABDOMINAL PAIN, UNSPECIFIED ABDOMINAL LOCATION: ICD-10-CM

## 2024-07-24 DIAGNOSIS — R19.7 DIARRHEA, UNSPECIFIED TYPE: ICD-10-CM

## 2024-07-24 DIAGNOSIS — R07.89 MUSCULOSKELETAL CHEST PAIN: Primary | ICD-10-CM

## 2024-07-24 PROCEDURE — 1160F RVW MEDS BY RX/DR IN RCRD: CPT | Mod: CPTII,S$GLB,,

## 2024-07-24 PROCEDURE — 3008F BODY MASS INDEX DOCD: CPT | Mod: CPTII,S$GLB,,

## 2024-07-24 PROCEDURE — 99999 PR PBB SHADOW E&M-EST. PATIENT-LVL III: CPT | Mod: PBBFAC,,,

## 2024-07-24 PROCEDURE — 3044F HG A1C LEVEL LT 7.0%: CPT | Mod: CPTII,S$GLB,,

## 2024-07-24 PROCEDURE — 1159F MED LIST DOCD IN RCRD: CPT | Mod: CPTII,S$GLB,,

## 2024-07-24 PROCEDURE — 99214 OFFICE O/P EST MOD 30 MIN: CPT | Mod: S$GLB,,,

## 2024-07-24 PROCEDURE — 3077F SYST BP >= 140 MM HG: CPT | Mod: CPTII,S$GLB,,

## 2024-07-24 PROCEDURE — 3080F DIAST BP >= 90 MM HG: CPT | Mod: CPTII,S$GLB,,

## 2024-07-24 RX ORDER — IBUPROFEN 600 MG/1
600 TABLET ORAL 3 TIMES DAILY
Qty: 15 TABLET | Refills: 0 | Status: SHIPPED | OUTPATIENT
Start: 2024-07-24

## 2024-07-24 RX ORDER — METHOCARBAMOL 500 MG/1
500 TABLET, FILM COATED ORAL 3 TIMES DAILY PRN
Qty: 15 TABLET | Refills: 0 | Status: SHIPPED | OUTPATIENT
Start: 2024-07-24 | End: 2024-08-03

## 2024-07-24 RX ORDER — HYDROGEN PEROXIDE 3 %
20 SOLUTION, NON-ORAL MISCELLANEOUS
COMMUNITY

## 2024-07-24 NOTE — PROGRESS NOTES
CHIEF COMPLAINT     Chief Complaint   Patient presents with    Abdominal Pain    Fatigue       HPI     Raymundo Abebe is a 43 y.o. male who presents for CP today.    PCP is Kinza Odonnell MD, patient is new to me. Pt has been seen for this issue multiple times by multiple providers over the past several months. He has a normal echo and EKG from April. The Cp has no discernable pattern and is not related to exercise, no associated SOB. He has f/u with cardiology next week. Discussed the possibility of anxiety causing some of these symptoms. Pt does not believe himself to be overly stressed or anxious. Is not interested in medication for this at this time. There has been some theory that this could be MSK and d/t repetitive motion as a cook. Will try NSAID and robaxin to see if it has any affect.    Pt has also been experiencing GI discomfort, mostly on the L side. Reports history of eating and then having diarrhea immediately after. This is intermittent. Reports seeing blood once or twice. Wonders if this could be related to the CP. He has started taking nexium to see if it helps. Will refer to GI for further eval.    Past Medical History:  Past Medical History:   Diagnosis Date    Hyperlipidemia 6/10/2022       Home Medications:  Prior to Admission medications    Medication Sig Start Date End Date Taking? Authorizing Provider   esomeprazole (NEXIUM) 20 MG capsule Take 20 mg by mouth before breakfast.   Yes Provider, Historical   ibuprofen (ADVIL,MOTRIN) 600 MG tablet Take 1 tablet (600 mg total) by mouth every 6 (six) hours as needed for Pain.  Patient not taking: Reported on 4/22/2024 11/26/19   Carlito Lombardi,        Review of Systems:  Review of Systems   Constitutional: Negative.    Respiratory: Negative.  Negative for shortness of breath.    Cardiovascular:  Positive for chest pain.   Gastrointestinal:  Positive for abdominal pain and diarrhea.       Health Maintainence:    Immunizations:  Health Maintenance         Date Due Completion Date    Pneumococcal Vaccines (Age 0-64) (1 of 2 - PCV) Never done ---    COVID-19 Vaccine (4 - 2023-24 season) 09/01/2023 11/6/2021    Influenza Vaccine (1) 09/01/2024 11/6/2021    Lipid Panel 04/23/2029 4/23/2024    TETANUS VACCINE 04/22/2034 4/22/2024             PHYSICAL EXAM     BP (!) 146/91 (BP Location: Left arm, Patient Position: Sitting)   Pulse 83   Wt 81.2 kg (179 lb 0.2 oz)   SpO2 98%   BMI 24.97 kg/m²     Physical Exam  Vitals reviewed.   Constitutional:       Appearance: He is well-developed.   HENT:      Head: Normocephalic and atraumatic.   Eyes:      Conjunctiva/sclera: Conjunctivae normal.   Cardiovascular:      Rate and Rhythm: Normal rate.   Pulmonary:      Effort: Pulmonary effort is normal. No respiratory distress.   Abdominal:      General: Abdomen is flat.      Palpations: Abdomen is soft.   Skin:     General: Skin is warm and dry.      Findings: No rash.   Neurological:      Mental Status: He is alert and oriented to person, place, and time.      Coordination: Coordination normal.   Psychiatric:         Behavior: Behavior normal.         LABS     Lab Results   Component Value Date    HGBA1C 5.5 04/23/2024     CMP  Sodium   Date Value Ref Range Status   04/23/2024 140 136 - 145 mmol/L Final     Potassium   Date Value Ref Range Status   04/23/2024 4.3 3.5 - 5.1 mmol/L Final     Chloride   Date Value Ref Range Status   04/23/2024 109 95 - 110 mmol/L Final     CO2   Date Value Ref Range Status   04/23/2024 25 23 - 29 mmol/L Final     Glucose   Date Value Ref Range Status   04/23/2024 103 70 - 110 mg/dL Final     BUN   Date Value Ref Range Status   04/23/2024 14 6 - 20 mg/dL Final     Creatinine   Date Value Ref Range Status   04/23/2024 0.9 0.5 - 1.4 mg/dL Final     Calcium   Date Value Ref Range Status   04/23/2024 9.3 8.7 - 10.5 mg/dL Final     Total Protein   Date Value Ref Range Status   04/23/2024 7.0 6.0 - 8.4 g/dL Final      Albumin   Date Value Ref Range Status   04/23/2024 4.2 3.5 - 5.2 g/dL Final     Total Bilirubin   Date Value Ref Range Status   04/23/2024 0.3 0.1 - 1.0 mg/dL Final     Comment:     For infants and newborns, interpretation of results should be based  on gestational age, weight and in agreement with clinical  observations.    Premature Infant recommended reference ranges:  Up to 24 hours.............<8.0 mg/dL  Up to 48 hours............<12.0 mg/dL  3-5 days..................<15.0 mg/dL  6-29 days.................<15.0 mg/dL       Alkaline Phosphatase   Date Value Ref Range Status   04/23/2024 68 55 - 135 U/L Final     AST   Date Value Ref Range Status   04/23/2024 19 10 - 40 U/L Final     ALT   Date Value Ref Range Status   04/23/2024 28 10 - 44 U/L Final     Anion Gap   Date Value Ref Range Status   04/23/2024 6 (L) 8 - 16 mmol/L Final     eGFR if    Date Value Ref Range Status   06/13/2022 >60 >60 mL/min/1.73 m^2 Final     eGFR if non    Date Value Ref Range Status   06/13/2022 >60 >60 mL/min/1.73 m^2 Final     Comment:     Calculation used to obtain the estimated glomerular filtration  rate (eGFR) is the CKD-EPI equation.        Lab Results   Component Value Date    WBC 12.12 04/23/2024    HGB 15.5 04/23/2024    HCT 47.2 04/23/2024    MCV 93 04/23/2024     04/23/2024     Lab Results   Component Value Date    CHOL 205 (H) 04/23/2024    CHOL 244 (H) 06/09/2022     Lab Results   Component Value Date    HDL 42 04/23/2024    HDL 51 06/09/2022     Lab Results   Component Value Date    LDLCALC 145.0 04/23/2024    LDLCALC 164.0 (H) 06/09/2022     Lab Results   Component Value Date    TRIG 90 04/23/2024    TRIG 145 06/09/2022     Lab Results   Component Value Date    CHOLHDL 20.5 04/23/2024    CHOLHDL 20.9 06/09/2022     Lab Results   Component Value Date    TSH 1.776 04/23/2024       ASSESSMENT/PLAN   1. Musculoskeletal chest pain  -     ibuprofen (ADVIL,MOTRIN) 600 MG tablet;  Take 1 tablet (600 mg total) by mouth 3 (three) times daily.  Dispense: 15 tablet; Refill: 0  -     methocarbamoL (ROBAXIN) 500 MG Tab; Take 1 tablet (500 mg total) by mouth 3 (three) times daily as needed (muscle pain).  Dispense: 15 tablet; Refill: 0    2. Abdominal pain, unspecified abdominal location  -     Ambulatory referral/consult to Gastroenterology; Future; Expected date: 07/31/2024    3. Diarrhea, unspecified type  -     Ambulatory referral/consult to Gastroenterology; Future; Expected date: 07/31/2024             Adrian Friedman NP   Department of Internal Medicine [facility]  1:39 PM    I spent a total of 36 minutes on the day of the visit.  This includes face to face time and non-face to face time preparing to see the patient (eg, review of tests), obtaining and/or reviewing separately obtained history, documenting clinical information in the electronic or other health record, independently interpreting results and communicating results to the patient/family/caregiver, or care coordinator.

## 2024-07-31 ENCOUNTER — OFFICE VISIT (OUTPATIENT)
Dept: CARDIOLOGY | Facility: CLINIC | Age: 44
End: 2024-07-31
Payer: COMMERCIAL

## 2024-07-31 DIAGNOSIS — R07.2 PRECORDIAL PAIN: Primary | ICD-10-CM

## 2024-07-31 DIAGNOSIS — R07.9 RECURRENT CHEST PAIN: ICD-10-CM

## 2024-07-31 PROCEDURE — 99999 PR PBB SHADOW E&M-EST. PATIENT-LVL III: CPT | Mod: PBBFAC,,, | Performed by: INTERNAL MEDICINE

## 2024-07-31 PROCEDURE — 99204 OFFICE O/P NEW MOD 45 MIN: CPT | Mod: S$GLB,,, | Performed by: INTERNAL MEDICINE

## 2024-07-31 PROCEDURE — 1159F MED LIST DOCD IN RCRD: CPT | Mod: CPTII,S$GLB,, | Performed by: INTERNAL MEDICINE

## 2024-07-31 PROCEDURE — 1160F RVW MEDS BY RX/DR IN RCRD: CPT | Mod: CPTII,S$GLB,, | Performed by: INTERNAL MEDICINE

## 2024-07-31 PROCEDURE — 3044F HG A1C LEVEL LT 7.0%: CPT | Mod: CPTII,S$GLB,, | Performed by: INTERNAL MEDICINE

## 2024-07-31 NOTE — PROGRESS NOTES
[facility]    Chief Complaint  Chief Complaint   Patient presents with    Chest Pain       HPI:    Referred for chest discomfort.  Has occurred intermittently for several years.  Describes a tightness across his left precordium.  May last a few minutes.  Occurs about twice per month.  No associated symptoms.  No aggravating or alleviating factors have been noted.  Says he had a stress test several years ago for similar discomfort and was told that the pain was not related to his heart.  Active but no regular exercise.    Medications  Current Outpatient Medications   Medication Sig Dispense Refill    esomeprazole (NEXIUM) 20 MG capsule Take 20 mg by mouth before breakfast.      ibuprofen (ADVIL,MOTRIN) 600 MG tablet Take 1 tablet (600 mg total) by mouth 3 (three) times daily. 15 tablet 0    methocarbamoL (ROBAXIN) 500 MG Tab Take 1 tablet (500 mg total) by mouth 3 (three) times daily as needed (muscle pain). 15 tablet 0     No current facility-administered medications for this visit.        History  Past Medical History:   Diagnosis Date    Hyperlipidemia 6/10/2022     Past Surgical History:   Procedure Laterality Date    FACIAL RECONSTRUCTION SURGERY  1987    WISDOM TOOTH EXTRACTION       Social History     Socioeconomic History    Marital status: Single   Tobacco Use    Smoking status: Every Day     Current packs/day: 1.00     Types: Cigarettes    Smokeless tobacco: Current   Substance and Sexual Activity    Alcohol use: Yes     Alcohol/week: 4.0 - 5.0 standard drinks of alcohol     Types: 4 - 5 Cans of beer per week     Comment: twice weekly    Drug use: No    Sexual activity: Yes     Social Determinants of Health     Financial Resource Strain: Low Risk  (4/26/2024)    Overall Financial Resource Strain (CARDIA)     Difficulty of Paying Living Expenses: Not hard at all   Food Insecurity: No Food Insecurity (4/26/2024)    Hunger Vital Sign     Worried About Running Out of Food in the Last Year: Never  true     Ran Out of Food in the Last Year: Never true   Transportation Needs: No Transportation Needs (4/26/2024)    PRAPARE - Transportation     Lack of Transportation (Medical): No     Lack of Transportation (Non-Medical): No   Physical Activity: Sufficiently Active (4/26/2024)    Exercise Vital Sign     Days of Exercise per Week: 7 days     Minutes of Exercise per Session: 30 min   Stress: Stress Concern Present (4/26/2024)    [employer] - Occupational Stress Questionnaire     Feeling of Stress : To some extent   Housing Stability: Unknown (4/26/2024)    Housing Stability Vital Sign     Unable to Pay for Housing in the Last Year: No     Family History   Problem Relation Name Age of Onset    Breast cancer Mother  47    Cancer Maternal Grandfather      Melanoma Paternal Grandfather      Colon cancer Neg Hx      Ovarian cancer Neg Hx      Prostate cancer Neg Hx      Heart attack Neg Hx      Stroke Neg Hx      Diabetes Neg Hx          Allergies  Review of patient's allergies indicates:   Allergen Reactions    Vancomycin analogues        Review of Systems   Review of Systems   Constitutional: Negative for malaise/fatigue, weight gain and weight loss.   Eyes:  Negative for visual disturbance.   Cardiovascular:  Positive for chest pain and palpitations. Negative for claudication, cyanosis, dyspnea on exertion, irregular heartbeat, leg swelling, near-syncope, orthopnea, paroxysmal nocturnal dyspnea and syncope.   Respiratory:  Negative for cough, hemoptysis, shortness of breath, sleep disturbances due to breathing and wheezing.    Hematologic/Lymphatic: Negative for bleeding problem. Does not bruise/bleed easily.   Skin:  Negative for poor wound healing.   Musculoskeletal:  Negative for muscle cramps and myalgias.   Gastrointestinal:  Negative for abdominal pain, anorexia, diarrhea, heartburn, hematemesis, hematochezia, melena, nausea and vomiting.   Genitourinary:  Negative for hematuria  and nocturia.   Neurological:  Negative for excessive daytime sleepiness, dizziness, focal weakness, light-headedness and weakness.       Physical Exam  Vitals:    07/31/24 1350   BP: (!) (P) 154/86   Pulse: (P) 84     Wt Readings from Last 1 Encounters:   07/31/24 (P) 82.8 kg (182 lb 9.6 oz)     Physical Exam  Vitals and nursing note reviewed.   Constitutional:       General: He is not in acute distress.     Appearance: He is not toxic-appearing or diaphoretic.   HENT:      Head: Normocephalic and atraumatic.      Mouth/Throat:      Lips: Pink.      Mouth: Mucous membranes are moist.   Eyes:      General: No scleral icterus.     Conjunctiva/sclera: Conjunctivae normal.   Neck:      Thyroid: No thyromegaly.      Vascular: No carotid bruit, hepatojugular reflux or JVD.      Trachea: Trachea normal.   Cardiovascular:      Rate and Rhythm: Normal rate and regular rhythm. No extrasystoles are present.     Chest Wall: PMI is not displaced.      Pulses:           Carotid pulses are 2+ on the right side and 2+ on the left side.       Radial pulses are 2+ on the right side and 2+ on the left side.        Dorsalis pedis pulses are 2+ on the right side and 2+ on the left side.        Posterior tibial pulses are 2+ on the right side and 2+ on the left side.      Heart sounds: S1 normal and S2 normal. No murmur heard.     No friction rub. No S3 or S4 sounds.   Pulmonary:      Effort: Pulmonary effort is normal. No tachypnea, bradypnea, accessory muscle usage or respiratory distress.      Breath sounds: Normal breath sounds and air entry. No decreased breath sounds, wheezing, rhonchi or rales.   Abdominal:      General: Bowel sounds are normal. There is no distension or abdominal bruit.      Palpations: Abdomen is soft. There is no hepatomegaly, splenomegaly or pulsatile mass.      Tenderness: There is no abdominal tenderness.   Musculoskeletal:         General: No tenderness or deformity.      Right lower leg: No edema.       Left lower leg: No edema.   Skin:     General: Skin is warm and dry.      Capillary Refill: Capillary refill takes less than 2 seconds.      Coloration: Skin is not cyanotic or pale.      Nails: There is no clubbing.   Neurological:      General: No focal deficit present.      Mental Status: He is alert and oriented to person, place, and time.   Psychiatric:         Attention and Perception: Attention normal.         Mood and Affect: Mood normal.         Speech: Speech normal.         Behavior: Behavior normal. Behavior is cooperative.         Labs  Lab Visit on 04/29/2024   Component Date Value Ref Range Status    CPK 04/29/2024 114  20 - 200 U/L Final    D-Dimer 04/29/2024 0.34  <0.50 mg/L FEU Final    Comment: The quantitative D-dimer assay should be used as an aid in   the diagnosis of deep vein thrombosis and pulmonary embolism  in patients with the appropriate presentation and clinical  history. The upper limit of the reference interval and the clinical   cut off   point are identical. Causes of a positive (>0.50 mg/L FEU) D-Dimer   test  include, but are not limited to: DVT, PE, DIC, thrombolytic   therapy, anticoagulant therapy, recent surgery, trauma, or   pregnancy, disseminated malignancy, aortic aneurysm, cirrhosis,  and severe infection. False negative results may occur in   patients with distal DVT.  АННА^612^^37^  LOT^610^[id number]     Hospital Outpatient Visit on 04/26/2024   Component Date Value Ref Range Status    BSA 04/26/2024 2.04  m2 Final    LVOT stroke volume 04/26/2024 112.63  cm3 Final    LVIDd 04/26/2024 5.14  3.5 - 6.0 cm Final    LV Systolic Volume 04/26/2024 46.85  mL Final    LV Systolic Volume Index 04/26/2024 23.1  mL/m2 Final    LVIDs 04/26/2024 3.38  2.1 - 4.0 cm Final    LV Diastolic Volume 04/26/2024 125.92  mL Final    LV Diastolic Volume Index 04/26/2024 62.03  mL/m2 Final    IVS 04/26/2024 0.78  0.6 - 1.1 cm Final    LVOT diameter 04/26/2024  2.44  cm Final    LVOT area 04/26/2024 4.7  cm2 Final    FS 04/26/2024 34  28 - 44 % Final    Left Ventricle Relative Wall Thick* 04/26/2024 0.30  cm Final    Posterior Wall 04/26/2024 0.78  0.6 - 1.1 cm Final    LV mass 04/26/2024 137.85  g Final    LV Mass Index 04/26/2024 68  g/m2 Final    MV Peak E Salvatore 04/26/2024 0.67  m/s Final    TDI LATERAL 04/26/2024 0.17  m/s Final    TDI SEPTAL 04/26/2024 0.10  m/s Final    E/E' ratio 04/26/2024 4.96  m/s Final    MV Peak A Salvatore 04/26/2024 0.59  m/s Final    TR Max Salvatore 04/26/2024 2.15  m/s Final    E/A ratio 04/26/2024 1.14   Final    IVRT 04/26/2024 106.57  msec Final    E wave deceleration time 04/26/2024 171.56  msec Final    LV SEPTAL E/E' RATIO 04/26/2024 6.70  m/s Final    LV LATERAL E/E' RATIO 04/26/2024 3.94  m/s Final    PV Peak S Salvatore 04/26/2024 0.53  m/s Final    PV Peak D Salvatore 04/26/2024 0.45  m/s Final    Pulm vein S/D ratio 04/26/2024 1.18   Final    LVOT peak salvatore 04/26/2024 1.19  m/s Final    Left Ventricular Outflow Tract Catina* 04/26/2024 0.80  cm/s Final    Left Ventricular Outflow Tract Catina* 04/26/2024 2.88  mmHg Final    RV S' 04/26/2024 13.57  cm/s Final    RVOT peak VTI 04/26/2024 17.9  cm Final    LA size 04/26/2024 3.57  cm Final    Left Atrium Minor Axis 04/26/2024 5.28  cm Final    Left Atrium Major Axis 04/26/2024 5.06  cm Final    LA volume (mod) 04/26/2024 58.35  cm3 Final    LA Volume Index (Mod) 04/26/2024 28.7  mL/m2 Final    RA Major [place] 04/26/2024 4.09  cm Final    AV mean gradient 04/26/2024 4  mmHg Final    AV peak gradient 04/26/2024 6  mmHg Final    Ao peak salvatore 04/26/2024 1.25  m/s Final    Ao VTI 04/26/2024 22.40  cm Final    LVOT peak VTI 04/26/2024 24.10  cm Final    AV valve area 04/26/2024 5.03  cm² Final    AV Velocity Ratio 04/26/2024 0.95   Final    AV index (prosthetic) 04/26/2024 1.08   Final    VEL by Velocity Ratio 04/26/2024 4.45  cm² Final    Mr max salvatore 04/26/2024 3.08  m/s Final    MV stenosis pressure 1/2 time 04/26/2024  49.75  ms Final    MV valve area p 1/2 method 04/26/2024 4.42  cm2 Final    Triscuspid Valve Regurgitation Pea* 04/26/2024 18  mmHg Final    PV mean gradient 04/26/2024 2  mmHg Final    PV PEAK VELOCITY 04/26/2024 1.16  m/s Final    PV peak gradient 04/26/2024 5  mmHg Final    RVOT peak ronnie 04/26/2024 0.92  m/s Final    STJ 04/26/2024 3.34  cm Final    Ascending aorta 04/26/2024 3.31  cm Final    IVC diameter 04/26/2024 1.14  cm Final    Mean e' 04/26/2024 0.14  m/s Final    ZLVIDS 04/26/2024 -0.69   Final    ZLVIDD 04/26/2024 -1.57   Final    LA Volume Index 04/26/2024 27.8  mL/m2 Final    LA volume 04/26/2024 56.45  cm3 Final    RVDD 04/26/2024 2.90  cm Final    TAPSE 04/26/2024 2.30  cm Final    RV/LV Ratio 04/26/2024 0.56  cm Final    LA WIDTH 04/26/2024 3.6  cm Final    RA Width 04/26/2024 3.9  cm Final    Sinus 04/26/2024 3.6  cm Final    TV resting pulmonary artery pressu* 04/26/2024 21  mmHg Final    RV TB RVSP 04/26/2024 5  mmHg Final    Est. RA pres 04/26/2024 3  mmHg Final    EF 04/26/2024 65  % Final    GLS 04/26/2024 19.0  % Final   Hospital Outpatient Visit on 04/23/2024   Component Date Value Ref Range Status    QRS Duration 04/23/2024 96  ms Final    [facility] QTC Calculation 04/23/2024 417  ms Final   Lab Visit on 04/23/2024   Component Date Value Ref Range Status    Sodium 04/23/2024 140  136 - 145 mmol/L Final    Potassium 04/23/2024 4.3  3.5 - 5.1 mmol/L Final    Chloride 04/23/2024 109  95 - 110 mmol/L Final    CO2 04/23/2024 25  23 - 29 mmol/L Final    Glucose 04/23/2024 103  70 - 110 mg/dL Final    BUN 04/23/2024 14  6 - 20 mg/dL Final    Creatinine 04/23/2024 0.9  0.5 - 1.4 mg/dL Final    Calcium 04/23/2024 9.3  8.7 - 10.5 mg/dL Final    Total Protein 04/23/2024 7.0  6.0 - 8.4 g/dL Final    Albumin 04/23/2024 4.2  3.5 - 5.2 g/dL Final    Total Bilirubin 04/23/2024 0.3  0.1 - 1.0 mg/dL Final    Comment: For infants and newborns, interpretation of results should be based  on gestational age, weight  and in agreement with clinical  observations.    Premature Infant recommended reference ranges:  Up to 24 hours.............<8.0 mg/dL  Up to 48 hours............<12.0 mg/dL  3-5 days..................<15.0 mg/dL  6-29 days.................<15.0 mg/dL      Alkaline Phosphatase 04/23/2024 68  55 - 135 U/L Final    AST 04/23/2024 19  10 - 40 U/L Final    ALT 04/23/2024 28  10 - 44 U/L Final    eGFR 04/23/2024 >60  >60 mL/min/1.73 m^2 Final    Anion Gap 04/23/2024 6 (L)  8 - 16 mmol/L Final    TSH 04/23/2024 1.776  0.400 - 4.000 uIU/mL Final    Cholesterol 04/23/2024 205 (H)  120 - 199 mg/dL Final    Comment: The National Cholesterol Education Program (NCEP) has set the  following guidelines (reference ranges) for Cholesterol:  Optimal.....................<200 mg/dL  Borderline High.............200-239 mg/dL  High........................> or = 240 mg/dL      Triglycerides 04/23/2024 90  30 - 150 mg/dL Final    Comment: The National Cholesterol Education Program (NCEP) has set the  following guidelines (reference values) for triglycerides:  Normal......................<150 mg/dL  Borderline High.............150-199 mg/dL  High........................200-499 mg/dL      HDL 04/23/2024 42  40 - 75 mg/dL Final    Comment: The National Cholesterol Education Program (NCEP) has set the  following guidelines (reference values) for HDL Cholesterol:  Low...............<40 mg/dL  Optimal...........>60 mg/dL      LDL Cholesterol 04/23/2024 145.0  63.0 - 159.0 mg/dL Final    Comment: The National Cholesterol Education Program (NCEP) has set the  following guidelines (reference values) for LDL Cholesterol:  Optimal.......................<130 mg/dL  Borderline High...............130-159 mg/dL  High..........................160-189 mg/dL  Very High.....................>190 mg/dL      HDL/Cholesterol Ratio 04/23/2024 20.5  20.0 - 50.0 % Final    Total Cholesterol/HDL Ratio 04/23/2024 4.9  2.0 - 5.0 Final    Non-HDL Cholesterol  04/23/2024 163  mg/dL Final    Comment: Risk category and Non-HDL cholesterol goals:  Coronary heart disease (CHD)or equivalent (10-year risk of CHD >20%):  Non-HDL cholesterol goal     <130 mg/dL  Two or more CHD risk factors and 10-year risk of CHD <= 20%:  Non-HDL cholesterol goal     <160 mg/dL  0 to 1 CHD risk factor:  Non-HDL cholesterol goal     <190 mg/dL      Hemoglobin A1C 04/23/2024 5.5  4.0 - 5.6 % Final    Comment: ADA Screening Guidelines:  5.7-6.4%  Consistent with prediabetes  >or=6.5%  Consistent with diabetes    High levels of fetal hemoglobin interfere with the HbA1C  assay. Heterozygous hemoglobin variants (HbS, HgC, etc)do  not significantly interfere with this assay.   However, presence of multiple variants may affect accuracy.      Estimated Avg Glucose 04/23/2024 111  68 - 131 mg/dL Final    WBC 04/23/2024 12.12  3.90 - 12.70 K/uL Final    RBC 04/23/2024 5.07  4.60 - 6.20 M/uL Final    Hemoglobin 04/23/2024 15.5  14.0 - 18.0 g/dL Final    Hematocrit 04/23/2024 47.2  40.0 - 54.0 % Final    MCV 04/23/2024 93  82 - 98 fL Final    MCH 04/23/2024 30.6  27.0 - 31.0 pg Final    MCHC 04/23/2024 32.8  32.0 - 36.0 g/dL Final    RDW 04/23/2024 13.5  11.5 - 14.5 % Final    Platelets 04/23/2024 202  150 - 450 K/uL Final    MPV 04/23/2024 10.7  9.2 - 12.9 fL Final    Immature Granulocytes 04/23/2024 0.5  0.0 - 0.5 % Final    Gran # (ANC) 04/23/2024 8.3 (H)  1.8 - 7.7 K/uL Final    Immature Grans (Abs) 04/23/2024 0.06 (H)  0.00 - 0.04 K/uL Final    Comment: Mild elevation in immature granulocytes is non specific and   can be seen in a variety of conditions including stress response,   acute inflammation, trauma and pregnancy. Correlation with other   laboratory and clinical findings is essential.      Lymph # 04/23/2024 2.3  1.0 - 4.8 K/uL Final    Mono # 04/23/2024 1.1 (H)  0.3 - 1.0 K/uL Final    Eos # 04/23/2024 0.3  0.0 - 0.5 K/uL Final    Baso # 04/23/2024 0.04  0.00 - 0.20 K/uL Final    nRBC  04/23/2024 0  0 /100 WBC Final    Gran % 04/23/2024 68.8  38.0 - 73.0 % Final    Lymph % 04/23/2024 18.6  18.0 - 48.0 % Final    Mono % 04/23/2024 9.0  4.0 - 15.0 % Final    Eosinophil % 04/23/2024 2.8  0.0 - 8.0 % Final    Basophil % 04/23/2024 0.3  0.0 - 1.9 % Final    Differential Method 04/23/2024 Automated   Final    Vit D, 25-Hydroxy 04/23/2024 32  30 - 96 ng/mL Final    Comment: Vitamin D deficiency.........<10 ng/mL                              Vitamin D insufficiency......10-29 ng/mL       Vitamin D sufficiency........> or equal to 30 ng/mL  Vitamin D toxicity............>100 ng/mL         Imaging  No results found.    Assessment  1. Recurrent chest pain  2. Precordial pain  - Ambulatory referral/consult to Cardiology  - Exercise Stress - EKG; Future      Plan and Discussion    Workup as above with further planning based on those results.  Discussed that regardless of results, need to focus on risk factor modification, especially smoking cessation.    The 10-year ASCVD risk score (Brandon DK, et al., 2019) is: 7.3%    Values used to calculate the score:      Age: 43 years      Sex: Male      Is Non- : No      Diabetic: No      Tobacco smoker: Yes      Systolic Blood Pressure: 154 mmHg      Is BP treated: No      HDL Cholesterol: 42 mg/dL      Total Cholesterol: 205 mg/dL     Follow Up  Follow up for test results.      Spencer Herrera MD

## 2024-10-21 ENCOUNTER — TELEPHONE (OUTPATIENT)
Dept: GASTROENTEROLOGY | Facility: CLINIC | Age: 44
End: 2024-10-21
Payer: COMMERCIAL

## 2024-10-21 NOTE — TELEPHONE ENCOUNTER
----- Message from Farrah sent at 10/21/2024  3:07 PM CDT -----  Regarding: Appt r/s  Contact: [phone]  Raymundo Abebe calling regarding Patient Advice (message) for # pt is calling needing to r/s his appt for tomorrow pls advise he would like to be added to waitlist

## 2024-11-15 NOTE — PROGRESS NOTES
The patient location is: Home  The chief complaint leading to consultation is: GERD    Visit type: audiovisual    Face to Face time with patient: 17 minutes  30 minutes of total time spent on the encounter, which includes face to face time and non-face to face time preparing to see the patient (eg, review of tests), Obtaining and/or reviewing separately obtained history, Documenting clinical information in the electronic or other health record, Independently interpreting results (not separately reported) and communicating results to the patient/family/caregiver, or Care coordination (not separately reported).     Each patient to whom he or she provides medical services by telemedicine is:  (1) informed of the relationship between the physician and patient and the respective role of any other health care provider with respect to management of the patient; and (2) notified that he or she may decline to receive medical services by telemedicine and may withdraw from such care at any time.                                                                                 Gastroenterology Progress Note    Reason for Visit:  The primary encounter diagnosis was Gastroesophageal reflux disease, unspecified whether esophagitis present. Diagnoses of Abdominal pain, unspecified abdominal location and Diarrhea, unspecified type were also pertinent to this visit.    PCP:   Kinza Odonnell   [address] [id number] / [facility] [id number]      Initial HPI   This is a 43 y.o. male presenting for post prandial abdominal pain, GERD and diarrhea. Referred from his IM provider for further evaluation. Pt was evaluated in July for symptoms of chest pain with extensive cardiac workup completed. Long history of reflux symptoms. He has been taking Nexium daily for the last year. This has been helping his symptoms. If he skips a day, he does report some upper abdominal pain. Has also eliminated several things from his diet that was thought  to trigger his reflux. He denies nausea, vomiting. Does report some post prandial upper abdominal pain. Nexium helps this.      Patient reports long history of diarrhea. Looser than normal in consistency. Will happen about 1-2 times daily. Coffee is a trigger. Has started fiber supplementation. One fiber has worked better than the one he is currently taking. Denies blood in stool.     Denies FH of GI malignancy.     Tobacco use-yes  ETOH intake-not anymore  NSAID use-None  Blood thinners-None    ROS:  Review of Systems   Constitutional:  Negative for chills, fever, malaise/fatigue and weight loss.   HENT:  Negative for sore throat.    Eyes:  Negative for redness.   Gastrointestinal:  Positive for abdominal pain, diarrhea and heartburn. Negative for blood in stool, constipation, melena, nausea and vomiting.   Skin:  Negative for rash.   Neurological:  Negative for dizziness, loss of consciousness and weakness.        Medical History:  has a past medical history of Hyperlipidemia (6/10/2022).    Surgical History:  has a past surgical history that includes Facial reconstruction surgery (1987) and [place] tooth extraction.    Family History: family history includes Breast cancer (age of onset: 47) in his mother; Cancer in his maternal grandfather; Melanoma in his paternal grandfather..       Review of patient's allergies indicates:   Allergen Reactions    Vancomycin analogues        Current Outpatient Medications on File Prior to Visit   Medication Sig Dispense Refill    esomeprazole (NEXIUM) 20 MG capsule Take 20 mg by mouth before breakfast.      ibuprofen (ADVIL,MOTRIN) 600 MG tablet Take 1 tablet (600 mg total) by mouth 3 (three) times daily. 15 tablet 0     No current facility-administered medications on file prior to visit.         Objective Findings:    Vital Signs:  There were no vitals taken for this visit.  There is no height or weight on file to calculate BMI.    Physical Exam:  Physical Exam  Neurological:       Mental Status: He is alert.             Labs:  Lab Results   Component Value Date    WBC 12.12 04/23/2024    HGB 15.5 04/23/2024    HCT 47.2 04/23/2024     04/23/2024    CHOL 205 (H) 04/23/2024    TRIG 90 04/23/2024    HDL 42 04/23/2024    ALKPHOS 68 04/23/2024    ALT 28 04/23/2024    AST 19 04/23/2024     04/23/2024    K 4.3 04/23/2024     04/23/2024    CREATININE 0.9 04/23/2024    BUN 14 04/23/2024    CO2 25 04/23/2024    TSH 1.776 04/23/2024    HGBA1C 5.5 04/23/2024         Imaging reviewed: No pertinent imaging reviewed      Endoscopy reviewed: No prior endoscopy performed      Assessment:  1. Gastroesophageal reflux disease, unspecified whether esophagitis present    2. Abdominal pain, unspecified abdominal location    3. Diarrhea, unspecified type             Recommendations:  Patient would like to try weaning his PPI. Recommendations reviewed. Will trial off of PPI. If symptoms return, will consider EGD for further evaluation.   US Abdomen to assess gallstones causing postprandial abdominal pain.  Recommended fiber supplementation (psyllium husk). Stool studies ordered. Celiac testing ordered. Pending results, will consider possible EGD/Colonoscopy if indicated and symptoms persist.      Thank you for allowing me to participate in this patient's care.    Sincerely,     Virginia Lenz NP  Gastroenterology Department  [facility]

## 2024-11-18 ENCOUNTER — OFFICE VISIT (OUTPATIENT)
Dept: GASTROENTEROLOGY | Facility: CLINIC | Age: 44
End: 2024-11-18
Payer: COMMERCIAL

## 2024-11-18 ENCOUNTER — TELEPHONE (OUTPATIENT)
Dept: GASTROENTEROLOGY | Facility: CLINIC | Age: 44
End: 2024-11-18

## 2024-11-18 DIAGNOSIS — R10.9 ABDOMINAL PAIN, UNSPECIFIED ABDOMINAL LOCATION: ICD-10-CM

## 2024-11-18 DIAGNOSIS — R19.7 DIARRHEA, UNSPECIFIED TYPE: ICD-10-CM

## 2024-11-18 DIAGNOSIS — K21.9 GASTROESOPHAGEAL REFLUX DISEASE, UNSPECIFIED WHETHER ESOPHAGITIS PRESENT: Primary | ICD-10-CM

## 2024-11-18 PROCEDURE — 3044F HG A1C LEVEL LT 7.0%: CPT | Mod: CPTII,95,,

## 2024-11-18 PROCEDURE — 99214 OFFICE O/P EST MOD 30 MIN: CPT | Mod: 95,,,

## 2024-11-18 NOTE — PATIENT INSTRUCTIONS
Weaning PPI  --Takine every other day for 2 weeks, then every 2nd day for 1 week, then every 3rd day for one week and so forth. Can take Pepcid as needed for symptoms.  --Stool kit from [facility]. Return to lab.   --Obtain labwork as well.  --US Abdomen ordered. Someone will call you to schedule this.     [facility]  High Fiber Diet    20-30 grams of fiber per day is recommended    Fiber cereal = 5 grams (Raisin Bran, Shredded Wheat, Grape Nuts)  Konsyl 1 teaspoon = 6 grams  Metamucil 1 tablespoon = 3 grams  Citrucel 1 tablespoon = 2 grams  Fiber Choice = 3-4 per day    Drink at least 4-5 glasses of liquids per day or the fiber can be constipating rather then stimulating to your gut.  Boil and bake potatoes in their skin. Eat the skin, too.  Include fresh fruits and raw vegetables in your daily diet. Raw fruits and vegetables have more useful fiber than those that have been peeled, cooked, pureed, or otherwise processed.  Eat a wide variety of fibrous foods in reasonable amounts. Increase fiber intake slowly especially if you have been on a low-fiber diet.  Eat more legumes-peas, beans, soybeans.  For snacks, try dried fruit, whole wheat and rye crackers.  Avoid instant-cook hot cereals. Use the longer cooking cereals.  Use bran whenever possible. Sprinkle it on top of cereal, mix it into mashed potatoes or hamburger meat, or use it in combination dishes such as meat loaf.   Substitute whole grain, whole wheat and bran products for white flour products.  Eat slowly and chew your food thoroughly.    Psyllium has been shown to improve both constipation and diarrhea. Fiber may increase bulk of stool and may also include alterations in the production of gaseous fermentation products and changes to the gut microbiome. As some patients may experience increased bloating and gas, we suggest a low starting dose of psyllium that provides approximately 3 to 4 grams of soluble fiber per day. The soluble fiber  content of psyllium products (ie, per packet, teaspoon, or pill) varies widely; refer to product-specific label to determine dose. The dose should then be slowly titrated up based on response to treatment.     Foods High in Fiber    This diet furnishes adequate amounts of all the essential nutrients needed by the body and a very liberal fiber or roughage content. Roughage is indigestible fiber found in fruits, vegetables and whole grain cereals. It provides bulk to the large intestine and, accompanied by an adequate fluid intake, is a stimulant to elimination. Regular eating and elimination habits are vital to good health.     Fruits:  Use all fruits and juices liberally; fresh, cooked, dried or canned. Eat fruit raw and with skins when possible. Have at least four servings of fruit daily including a citrus fruit and a stewed dried fruit. Hard seeds of fruits (berries, figs, Grapes, mangoes, tomatoes) etc. may be removed.    Vegetables: Use all vegetables liberally. Green leafy vegetables, such as cabbage, spinach, lettuce, broccoli, and other greens are particularly good.    Potato: As desired. Serve baked frequently and eat the skin. Other starchy foods such as rice, macaroni, etc., may be occasionally substituted. Chew popcorn well and do not eat hard kernels.    Meat, Fish, Poultry: One or two servings daily.    Eggs: One daily if you are not on a low cholesterol diet.    Milk: Include at least one-half pint daily. Whole milk or skimmed may be used.     Cereals: Use whole grain breads and cereals such as bran, bran flakes, grape nut flakes, puffed wheat, oatmeal, Wheaties, etc., as much as possible. Refined breaks and cereals are not restricted; however, they do not contain the bulk necessary for this diet.     Sugars, Sweets: Use very moderately. Depend on fruit as dessert.    Fats: Use butter or margarine as desired. Oil or dressing on salads as desired. Fried foods may be used in moderation. Nuts may be eaten  if you chew them well and may be ground or finely chopped for cooking.   Sample Menu                                                                                 Breakfast                          Lunch  Orange juice, 4 ounces                                                Vegetable soup                    Stewed fruit                                                                 Fresh fruit plate with cottage cheese  Shredded wheat                                                           Whole wheat toast  Scrambled eggs                                                           Butter  Whole wheat toast                                                       Coffee or tea  Dinner                                                                         Bedtime  Large glass tomato juice                                             1 glass milk  Roast beef                                                                   stewed fruit  Baked potato with skin  Buttered spinach  Raw vegetable salad  Baked apple with skin   Coffee or tea

## 2024-11-18 NOTE — TELEPHONE ENCOUNTER
Spoke with patient.  Scheduled for lab work this Wednesday at the [facility] location. While there he will  a labeled specimen container along with orders in order to collect and submit a stool specimen.   He is scheduled for an ultrasound on 11/25 at the Imaging center on [facility] for 11:45.   He is aware he will need to fast 8 hours prior to his appointment time.  Deirdre

## 2024-11-20 ENCOUNTER — LAB VISIT (OUTPATIENT)
Dept: LAB | Facility: HOSPITAL | Age: 44
End: 2024-11-20
Payer: COMMERCIAL

## 2024-11-20 DIAGNOSIS — R19.7 DIARRHEA, UNSPECIFIED TYPE: ICD-10-CM

## 2024-11-20 PROCEDURE — 36415 COLL VENOUS BLD VENIPUNCTURE: CPT | Mod: PN

## 2024-11-20 PROCEDURE — 86258 DGP ANTIBODY EACH IG CLASS: CPT | Mod: 59

## 2024-11-22 ENCOUNTER — PATIENT MESSAGE (OUTPATIENT)
Dept: GASTROENTEROLOGY | Facility: CLINIC | Age: 44
End: 2024-11-22
Payer: COMMERCIAL

## 2024-11-25 ENCOUNTER — HOSPITAL ENCOUNTER (OUTPATIENT)
Dept: RADIOLOGY | Facility: HOSPITAL | Age: 44
Discharge: HOME | End: 2024-11-25
Payer: COMMERCIAL

## 2024-11-25 DIAGNOSIS — K21.9 GASTROESOPHAGEAL REFLUX DISEASE, UNSPECIFIED WHETHER ESOPHAGITIS PRESENT: ICD-10-CM

## 2024-11-25 DIAGNOSIS — R10.9 ABDOMINAL PAIN, UNSPECIFIED ABDOMINAL LOCATION: ICD-10-CM

## 2024-11-25 LAB
GLIADIN PEPTIDE IGA SER-ACNC: 1.3 U/ML
GLIADIN PEPTIDE IGG SER-ACNC: 0.7 U/ML
IGA SERPL-MCNC: 104 MG/DL (ref 70–400)
TTG IGA SER-ACNC: 0.2 U/ML
TTG IGG SER-ACNC: 0.6 U/ML

## 2024-11-25 PROCEDURE — 76705 ECHO EXAM OF ABDOMEN: CPT | Mod: 26,,, | Performed by: RADIOLOGY

## 2024-11-25 PROCEDURE — 76705 ECHO EXAM OF ABDOMEN: CPT | Mod: TC

## 2025-08-26 ENCOUNTER — LAB VISIT (OUTPATIENT)
Dept: LAB | Facility: OTHER | Age: 45
End: 2025-08-26
Attending: STUDENT IN AN ORGANIZED HEALTH CARE EDUCATION/TRAINING PROGRAM
Payer: COMMERCIAL

## 2025-09-03 ENCOUNTER — HOSPITAL ENCOUNTER (OUTPATIENT)
Dept: PULMONOLOGY | Facility: OTHER | Age: 45
Discharge: HOME | End: 2025-09-03
Attending: STUDENT IN AN ORGANIZED HEALTH CARE EDUCATION/TRAINING PROGRAM
Payer: COMMERCIAL

## 2025-09-03 VITALS — OXYGEN SATURATION: 97 % | HEART RATE: 67 BPM | RESPIRATION RATE: 20 BRPM

## 2025-09-03 DIAGNOSIS — R07.89 CHEST TIGHTNESS: ICD-10-CM

## 2025-09-03 LAB
DLCO ADJ PRE: 30.9 ML/(MIN*MMHG)
DLCO SINGLE BREATH LLN: 26.5
DLCO SINGLE BREATH PRE REF: 93.2 %
DLCO SINGLE BREATH REF: 33.43
DLCOC SBVA LLN: 3.38
DLCOC SBVA PRE REF: 90.3 %
DLCOC SBVA REF: 4.58
DLCOC SINGLE BREATH LLN: 26.5
DLCOC SINGLE BREATH PRE REF: 92.4 %
DLCOC SINGLE BREATH REF: 33.43
DLCOCSBVAULN: 5.77
DLCOCSINGLEBREATHULN: 40.36
DLCOCSINGLEBREATHZSCORE: -0.6
DLCOSINGLEBREATHULN: 40.36
DLCOSINGLEBREATHZSCORE: -0.54
DLCOVA LLN: 3.38
DLCOVA PRE REF: 91.1 %
DLCOVA PRE: 4.17 ML/(MIN*MMHG*L)
DLCOVA REF: 4.58
DLCOVAULN: 5.77
DLVAADJ PRE: 4.14 ML/(MIN*MMHG*L)
ERVN2 LLN: -16448.55
ERVN2 PRE REF: 83 %
ERVN2 PRE: 1.2 L
ERVN2 REF: 1.45
ERVN2ULN: NORMAL
FEF 25 75 LLN: 2.32
FEF 25 75 PRE REF: 79.3 %
FEF 25 75 REF: 4.05
FET100 CHG: -15.6 %
FEV05 LLN: 2.06
FEV05 REF: 3.2
FEV1 CHG: 5.3 %
FEV1 FVC LLN: 70
FEV1 FVC PRE REF: 90.6 %
FEV1 FVC REF: 80
FEV1 LLN: 3.37
FEV1 PRE REF: 94.5 %
FEV1 REF: 4.26
FEV1 VOL CHG: 0.21
FRCN2 LLN: 2.51
FRCN2 PRE REF: 81.8 %
FRCN2 REF: 3.5
FRCN2ULN: 4.49
FVC CHG: -0.2 %
FVC LLN: 4.24
FVC PRE REF: 103.8 %
FVC REF: 5.35
FVC VOL CHG: -0.01
ICN2REF: 3.71
IVC PRE: 5.31 L
IVC SINGLE BREATH LLN: 4.24
IVC SINGLE BREATH PRE REF: 99.4 %
IVC SINGLE BREATH REF: 5.35
IVCSINGLEBREATHULN: 6.47
LLN IC N2: -9999996.29
PEF LLN: 7.95
PEF PRE REF: 61.2 %
PEF REF: 10.32
PHYSICIAN COMMENT: NORMAL
POST FEF 25 75: 3.68 L/S
POST FET 100: 9.58 SEC
POST FEV1 FVC: 76.54 %
POST FEV1: 4.24 L
POST FEV5: 3.14 L
POST FVC: 5.54 L
POST PEF: 8.41 L/S
PRE DLCO: 31.16 ML/(MIN*MMHG)
PRE FEF 25 75: 3.21 L/S
PRE FET 100: 11.35 SEC
PRE FEV05 REF: 88.5 %
PRE FEV1 FVC: 72.56 %
PRE FEV1: 4.03 L
PRE FEV5: 2.83 L
PRE FRC N2: 2.86 L
PRE FVC: 5.55 L
PRE IC N2: 3.98 L
PRE PEF: 6.32 L/S
PRE REF IC N2: 107.5 %
RVN2 LLN: 1.38
RVN2 PRE REF: 63.2 %
RVN2 PRE: 1.3 L
RVN2 REF: 2.05
RVN2TLCN2 PRE REF: 62.4 %
RVN2TLCN2 PRE: 18.93 %
RVN2TLCN2 REF: 30.34
RVN2ULN: 2.73
TLCN2 LLN: 6.15
TLCN2 PRE REF: 93.8 %
TLCN2 PRE: 6.85 L
TLCN2 REF: 7.3
TLCN2ULN: 8.45
ULN IC N2: NORMAL
VA PRE: 7.47 L
VA SINGLE BREATH LLN: 7.15
VA SINGLE BREATH PRE REF: 104.5 %
VA SINGLE BREATH REF: 7.15
VASINGLEBREATHULN: 7.15
VCMAXN2 LLN: 4.24
VCMAXN2 PRE REF: 103.8 %
VCMAXN2 PRE: 5.55 L
VCMAXN2 REF: 5.35
VCMAXN2ULN: 6.47

## 2025-09-03 PROCEDURE — 94060 EVALUATION OF WHEEZING: CPT

## 2025-09-03 PROCEDURE — 94727 GAS DIL/WSHOT DETER LNG VOL: CPT

## 2025-09-03 PROCEDURE — 94729 DIFFUSING CAPACITY: CPT

## 2025-09-03 PROCEDURE — 25000242 PHARM REV CODE 250 ALT 637 W/ HCPCS: Performed by: STUDENT IN AN ORGANIZED HEALTH CARE EDUCATION/TRAINING PROGRAM

## 2025-09-03 RX ORDER — ALBUTEROL SULFATE 2.5 MG/.5ML
2.5 SOLUTION RESPIRATORY (INHALATION) ONCE
Status: COMPLETED | OUTPATIENT
Start: 2025-09-03 | End: 2025-09-03

## 2025-09-03 RX ADMIN — ALBUTEROL SULFATE 2.5 MG: 2.5 SOLUTION RESPIRATORY (INHALATION) at 11:09

## 2025-09-04 ENCOUNTER — TELEPHONE (OUTPATIENT)
Dept: SPORTS MEDICINE | Facility: CLINIC | Age: 45
End: 2025-09-04
Payer: COMMERCIAL